# Patient Record
Sex: FEMALE | Race: WHITE | Employment: UNEMPLOYED | URBAN - METROPOLITAN AREA
[De-identification: names, ages, dates, MRNs, and addresses within clinical notes are randomized per-mention and may not be internally consistent; named-entity substitution may affect disease eponyms.]

---

## 2017-10-25 ENCOUNTER — OFFICE VISIT (OUTPATIENT)
Dept: FAMILY MEDICINE CLINIC | Age: 52
End: 2017-10-25

## 2017-10-25 VITALS — HEIGHT: 67 IN | WEIGHT: 164.4 LBS | BODY MASS INDEX: 25.8 KG/M2

## 2017-10-25 DIAGNOSIS — R30.9 URINARY PAIN: Primary | ICD-10-CM

## 2017-10-25 DIAGNOSIS — Z23 ENCOUNTER FOR IMMUNIZATION: ICD-10-CM

## 2017-10-25 LAB
BILIRUB UR QL STRIP: NORMAL
GLUCOSE UR-MCNC: NEGATIVE MG/DL
KETONES P FAST UR STRIP-MCNC: NEGATIVE MG/DL
PH UR STRIP: 7 [PH] (ref 4.6–8)
PROT UR QL STRIP: NORMAL MG/DL
SP GR UR STRIP: 1.02 (ref 1–1.03)
UA UROBILINOGEN AMB POC: NORMAL (ref 0.2–1)
URINALYSIS CLARITY POC: CLEAR
URINALYSIS COLOR POC: NORMAL
URINE BLOOD POC: NORMAL
URINE LEUKOCYTES POC: NEGATIVE
URINE NITRITES POC: POSITIVE

## 2017-10-25 RX ORDER — HYDROCHLOROTHIAZIDE 25 MG/1
TABLET ORAL
Refills: 1 | COMMUNITY
Start: 2017-10-07

## 2017-10-25 RX ORDER — LIOTHYRONINE SODIUM 5 UG/1
TABLET ORAL
Refills: 2 | COMMUNITY
Start: 2017-10-05

## 2017-10-25 RX ORDER — DEXTROAMPHETAMINE SACCHARATE, AMPHETAMINE ASPARTATE, DEXTROAMPHETAMINE SULFATE AND AMPHETAMINE SULFATE 5; 5; 5; 5 MG/1; MG/1; MG/1; MG/1
20 TABLET ORAL 3 TIMES DAILY
Refills: 0 | COMMUNITY
Start: 2017-09-17

## 2017-10-25 RX ORDER — LEVOTHYROXINE SODIUM 200 UG/1
TABLET ORAL
Refills: 2 | COMMUNITY
Start: 2017-09-04

## 2017-10-25 RX ORDER — AMLODIPINE BESYLATE 5 MG/1
TABLET ORAL
Refills: 2 | COMMUNITY
Start: 2017-10-14

## 2017-10-25 RX ORDER — CITALOPRAM 40 MG/1
TABLET, FILM COATED ORAL
Refills: 5 | COMMUNITY
Start: 2017-10-18 | End: 2018-08-31

## 2017-10-25 RX ORDER — CIPROFLOXACIN 500 MG/1
500 TABLET ORAL 2 TIMES DAILY
Qty: 20 TAB | Refills: 0 | Status: SHIPPED | OUTPATIENT
Start: 2017-10-25 | End: 2017-11-04

## 2017-10-25 NOTE — PROGRESS NOTES
Chief Complaint   Patient presents with    New Patient     Establish care    Bladder Infection     Patient seen in the office today with a friend present for a new patient visit  Patient stated her last provider left the practice and needs medication refills   Also has c/o of bladder pain,urinary frequency x's 2 years. Reports having a stent placed then removed. Patient states providers informed her she is resistant to abt's    Written order received to administer 0.5 ml Influenza Vaccine Flulaval Quadrivalent 2017/2018 Formula. After obtaining written consent from the patient,  Flu vaccine was administered to left  deltoid by Brenda Brady LPN. NDC: 59430-687-98 Lot: [id number], Exp: 04/30/18 Manf: [employer].  Patient provided VIS & observed with no s/s of adverse reactions.

## 2017-10-25 NOTE — MR AVS SNAPSHOT
Visit Information Date & Time Provider Department Dept. Phone Encounter #  
 10/25/2017 10:00 AM Tal Azevedo MD [address] [facility] [phone] [id number] Upcoming Health Maintenance Date Due Hepatitis C Screening 1965 DTaP/Tdap/Td series (1 - Tdap) 3/1/1986 PAP AKA CERVICAL CYTOLOGY 3/1/1986 BREAST CANCER SCRN MAMMOGRAM 3/1/2015 FOBT Q 1 YEAR AGE 50-75 3/1/2015 INFLUENZA AGE 9 TO ADULT 8/1/2017 Allergies as of 10/25/2017  Review Complete On: 10/25/2017 By: Tal Azevedo MD  
  
 Severity Noted Reaction Type Reactions Codeine  03/09/2011    Other (comments)  
 pain Imitrex [Sumatriptan Succinate]  03/09/2011    Other (comments) Chest pain Current Immunizations  Reviewed on 5/25/2014 Name Date Influenza Vaccine (Quad) PF  Incomplete Not reviewed this visit You Were Diagnosed With   
  
 Codes Comments Urinary pain    -  Primary ICD-10-CM: R30.9 ICD-9-CM: 788.1 Encounter for immunization     ICD-10-CM: J29 ICD-9-CM: V03.89 Vitals Height(growth percentile) Weight(growth percentile) BMI OB Status Smoking Status 5' 7\" (1.702 m) 164 lb 6.4 oz (74.6 kg) 25.75 kg/m2 Ablation Never Smoker Vitals History BMI and BSA Data Body Mass Index Body Surface Area 25.75 kg/m 2 1.88 m 2 Preferred Pharmacy Pharmacy Name Phone [id number] [address] [facility] [phone] Your Updated Medication List  
  
   
This list is accurate as of: 10/25/17 10:48 AM.  Always use your most recent med list. amLODIPine 5 mg tablet Commonly known as:  Horris Bills TAKE 1 TABLET EVERY DAY  
  
 aspirin 325 mg tablet Commonly known as:  ASPIRIN Take 325 mg by mouth daily. ciprofloxacin HCl 500 mg tablet Commonly known as:  CIPRO Take 1 Tab by mouth two (2) times a day for 10 days.   
  
 citalopram 40 mg tablet Commonly known as:  CELEXA  
TAKE 1 TABLET BY MOUTH ONCE A DAY  
  
 dextroamphetamine-amphetamine 20 mg tablet Commonly known as:  ADDERALL 20 mg three (3) times daily. hydroCHLOROthiazide 25 mg tablet Commonly known as:  HYDRODIURIL  
TAKE 1 TABLET BY MOUTH EVERY DAY  
  
 * levothyroxine 125 mcg tablet Commonly known as:  SYNTHROID Take 125 mcg by mouth Daily (before breakfast). * levothyroxine 200 mcg tablet Commonly known as:  SYNTHROID  
TAKE 1 TABLET BY MOUTH DAILY  
  
 liothyronine 5 mcg tablet Commonly known as:  CYTOMEL  
TAKE 2 TABLETS BY MOUTH EVERY DAY  
  
 TYLENOL EXTRA STRENGTH 500 mg tablet Generic drug:  acetaminophen Take  by mouth every six (6) hours as needed. * Notice: This list has 2 medication(s) that are the same as other medications prescribed for you. Read the directions carefully, and ask your doctor or other care provider to review them with you. Prescriptions Sent to Pharmacy Refills  
 ciprofloxacin HCl (CIPRO) 500 mg tablet 0 Sig: Take 1 Tab by mouth two (2) times a day for 10 days. Class: Normal  
 Pharmacy: [address] [place] [address] Ph #: [phone] Route: Oral  
  
We Performed the Following AMB POC URINALYSIS DIP STICK MANUAL W/O MICRO [96004 CPT(R)] CULTURE, URINE G7192096 CPT(R)] INFLUENZA VIRUS VAC QUAD,SPLIT,PRESV FREE SYRINGE IM [id number] CPT(R)] CA IMMUNIZ ADMIN,1 SINGLE/COMB VAC/TOXOID Q2766405 CPT(R)] Introducing [facility] & HEALTH SERVICES! Silviano Singletary introduces You.Do patient portal. Now you can access parts of your medical record, email your doctor's office, and request medication refills online. 1. In your internet browser, go to [email or web address] 2. Click on the First Time User? Click Here link in the Sign In box. You will see the New Member Sign Up page. 3. Enter your You.Do Access Code exactly as it appears below.  You will not need to use this code after youve completed the sign-up process. If you do not sign up before the expiration date, you must request a new code. · [email or web address] Access Code: [email or web address] Expires: 1/23/2018 10:48 AM 
 
4. Enter the last four digits of your Social Security Number (xxxx) and Date of Birth (mm/dd/yyyy) as indicated and click Submit. You will be taken to the next sign-up page. 5. Create a AdTaily.com ID. This will be your AdT[employer]com login ID and cannot be changed, so think of one that is secure and easy to remember. 6. Create a [employer].com password. You can change your password at any time. 7. Enter your Password Reset Question and Answer. This can be used at a later time if you forget your password. 8. Enter your e-mail address. You will receive e-mail notification when new information is available in [address] 9. Click Sign Up. You can now view and download portions of your medical record. 10. Click the Download Summary menu link to download a portable copy of your medical information. If you have questions, please visit the Frequently Asked Questions section of the [email or web address] website. Remember, AdTaily.com is NOT to be used for urgent needs. For medical emergencies, dial 911. Now available from your iPhone and Android! Please provide this summary of care documentation to your next provider. Your primary care clinician is listed as Mike Guerrero. If you have any questions after today's visit, please call [phone].

## 2017-10-25 NOTE — PROGRESS NOTES
Chief Complaint   Patient presents with    New Patient     Establish care    Bladder Infection     Patient seen in the office today with a friend present for a new patient visit  Patient stated her last provider left the practice and needs medication refills   Also has c/o of bladder pain,urinary frequency x's 2 years. Reports having a stent placed then removed. Patient states providers informed her she is resistant to abt's    Pt is a pt of Dr. Alize Young. Pt is on Adderall, 20mg TID, plans to provide records. Subjective: (As above and below)     Chief Complaint   Patient presents with    New Patient     Establish care    Bladder Infection     she is a 46y.o. year old female who presents for evaluation. Reviewed PmHx, RxHx, FmHx, SocHx, AllgHx and updated in chart. Review of Systems - negative except as listed above    Objective:     Vitals:    10/25/17 1018   Weight: 164 lb 6.4 oz (74.6 kg)   Height: 5' 7\" (1.702 m)     Physical Examination: General appearance - alert, well appearing, and in no distress  Mental status - normal mood, behavior, speech, dress, motor activity, and thought processes  Mouth - mucous membranes moist, pharynx normal without lesions  Chest - clear to auscultation, no wheezes, rales or rhonchi, symmetric air entry  Heart - normal rate, regular rhythm, normal S1, S2, no murmurs, rubs, clicks or gallops  Abdomen - soft, nontender, nondistended, no masses or organomegaly  Musculoskeletal - no joint tenderness, deformity or swelling  Extremities - peripheral pulses normal, no pedal edema, no clubbing or cyanosis    Assessment/ Plan:   1. Urinary pain  -Cipro as written, check culture due to resistant infection  - AMB POC URINALYSIS DIP STICK MANUAL W/O MICRO  - CULTURE, URINE    2.  Encounter for immunization  - Influenza virus vaccine (QUADRIVALENT PRES FREE SYRINGE) IM (16696)  - ID IMMUNIZ ADMIN,1 SINGLE/COMB VAC/TOXOID     Advised pt that adderall can not be prescribed until records are received, will not prescribe for more than twice daily dosing. Follow-up Disposition: As needed  I have discussed the diagnosis with the patient and the intended plan as seen in the above orders. The patient has received an after-visit summary and questions were answered concerning future plans.      Medication Side Effects and Warnings were discussed with patient: yes  Patient Labs were reviewed: yes  Patient Past Records were reviewed:  yes    Richard Lopes M.D.

## 2017-10-27 LAB — BACTERIA UR CULT: ABNORMAL

## 2017-10-29 RX ORDER — NITROFURANTOIN 25; 75 MG/1; MG/1
100 CAPSULE ORAL 2 TIMES DAILY
Qty: 14 CAP | Refills: 0 | Status: SHIPPED | OUTPATIENT
Start: 2017-10-29 | End: 2017-11-05

## 2017-10-29 NOTE — PROGRESS NOTES
Please advise pt that bacteria grown is resistant to current antibiotic. Medication needs to be changed. New Rx sent to pharmacy on file. Please inform.

## 2017-10-31 NOTE — PROGRESS NOTES
[phone] 2nd attempt to contact pt. Recording states number is unavailable. Abnormal lab letter placed in mail for pt.

## 2017-11-01 NOTE — PROGRESS NOTES
[phone] 3rd attempt to contact pt. Left a VM for her to [facility] to office.

## 2018-03-21 ENCOUNTER — ANESTHESIA (OUTPATIENT)
Dept: ENDOSCOPY | Age: 53
End: 2018-03-21
Payer: COMMERCIAL

## 2018-03-21 ENCOUNTER — HOSPITAL ENCOUNTER (OUTPATIENT)
Age: 53
Setting detail: OUTPATIENT SURGERY
Discharge: HOME | End: 2018-03-21
Attending: INTERNAL MEDICINE | Admitting: INTERNAL MEDICINE
Payer: COMMERCIAL

## 2018-03-21 ENCOUNTER — ANESTHESIA EVENT (OUTPATIENT)
Dept: ENDOSCOPY | Age: 53
End: 2018-03-21
Payer: COMMERCIAL

## 2018-03-21 VITALS
BODY MASS INDEX: 24.75 KG/M2 | RESPIRATION RATE: 16 BRPM | OXYGEN SATURATION: 86 % | TEMPERATURE: 98.1 F | HEART RATE: 63 BPM | DIASTOLIC BLOOD PRESSURE: 76 MMHG | SYSTOLIC BLOOD PRESSURE: 137 MMHG | WEIGHT: 158 LBS

## 2018-03-21 PROCEDURE — 77030008684 HC TU ET CUF COVD -B: Performed by: ANESTHESIOLOGY

## 2018-03-21 PROCEDURE — C1726 CATH, BAL DIL, NON-VASCULAR: HCPCS | Performed by: INTERNAL MEDICINE

## 2018-03-21 PROCEDURE — 74011250636 HC RX REV CODE- 250/636: Performed by: INTERNAL MEDICINE

## 2018-03-21 PROCEDURE — 77030026438 HC STYL ET INTUB CARD -A: Performed by: ANESTHESIOLOGY

## 2018-03-21 PROCEDURE — 74011000250 HC RX REV CODE- 250

## 2018-03-21 PROCEDURE — 77030009038 HC CATH BILI STN RTVR BSC -C: Performed by: INTERNAL MEDICINE

## 2018-03-21 PROCEDURE — 76060000033 HC ANESTHESIA 1 TO 1.5 HR: Performed by: INTERNAL MEDICINE

## 2018-03-21 PROCEDURE — 77030012596 HC SPHNTOM BILI BSC -E: Performed by: INTERNAL MEDICINE

## 2018-03-21 PROCEDURE — 76040000008: Performed by: INTERNAL MEDICINE

## 2018-03-21 PROCEDURE — 88112 CYTOPATH CELL ENHANCE TECH: CPT | Performed by: INTERNAL MEDICINE

## 2018-03-21 PROCEDURE — 77030007288 HC DEV LOK BILI BSC -A: Performed by: INTERNAL MEDICINE

## 2018-03-21 PROCEDURE — C2625 STENT, NON-COR, TEM W/DEL SY: HCPCS | Performed by: INTERNAL MEDICINE

## 2018-03-21 PROCEDURE — 77030036933 HC BRSH RX CYTO WREGD BSC -C: Performed by: INTERNAL MEDICINE

## 2018-03-21 PROCEDURE — 77030010603 HC BLN DEV INFL BSC -B: Performed by: INTERNAL MEDICINE

## 2018-03-21 PROCEDURE — 74011636320 HC RX REV CODE- 636/320: Performed by: INTERNAL MEDICINE

## 2018-03-21 PROCEDURE — 74011250636 HC RX REV CODE- 250/636

## 2018-03-21 DEVICE — BILIARY STENT WITH NAVIFLEXTM RX DELIVERY SYSTEM
Type: IMPLANTABLE DEVICE | Site: BILE DUCT | Status: FUNCTIONAL
Brand: ADVANIX™ BILIARY

## 2018-03-21 RX ORDER — ATROPINE SULFATE 0.1 MG/ML
0.5 INJECTION INTRAVENOUS
Status: DISCONTINUED | OUTPATIENT
Start: 2018-03-21 | End: 2018-03-21 | Stop reason: HOSPADM

## 2018-03-21 RX ORDER — MIDAZOLAM HYDROCHLORIDE 1 MG/ML
.25-1 INJECTION, SOLUTION INTRAMUSCULAR; INTRAVENOUS
Status: DISCONTINUED | OUTPATIENT
Start: 2018-03-21 | End: 2018-03-21 | Stop reason: HOSPADM

## 2018-03-21 RX ORDER — ROCURONIUM BROMIDE 10 MG/ML
INJECTION, SOLUTION INTRAVENOUS AS NEEDED
Status: DISCONTINUED | OUTPATIENT
Start: 2018-03-21 | End: 2018-03-21 | Stop reason: HOSPADM

## 2018-03-21 RX ORDER — FLUMAZENIL 0.1 MG/ML
0.2 INJECTION INTRAVENOUS
Status: DISCONTINUED | OUTPATIENT
Start: 2018-03-21 | End: 2018-03-21 | Stop reason: HOSPADM

## 2018-03-21 RX ORDER — DEXTROMETHORPHAN/PSEUDOEPHED 2.5-7.5/.8
1.2 DROPS ORAL
Status: DISCONTINUED | OUTPATIENT
Start: 2018-03-21 | End: 2018-03-21 | Stop reason: HOSPADM

## 2018-03-21 RX ORDER — EPINEPHRINE 0.1 MG/ML
1 INJECTION INTRACARDIAC; INTRAVENOUS
Status: DISCONTINUED | OUTPATIENT
Start: 2018-03-21 | End: 2018-03-21 | Stop reason: HOSPADM

## 2018-03-21 RX ORDER — ONDANSETRON 2 MG/ML
4-8 INJECTION INTRAMUSCULAR; INTRAVENOUS ONCE
Status: COMPLETED | OUTPATIENT
Start: 2018-03-21 | End: 2018-03-21

## 2018-03-21 RX ORDER — FENTANYL CITRATE 50 UG/ML
100 INJECTION, SOLUTION INTRAMUSCULAR; INTRAVENOUS
Status: DISCONTINUED | OUTPATIENT
Start: 2018-03-21 | End: 2018-03-21 | Stop reason: HOSPADM

## 2018-03-21 RX ORDER — PROPOFOL 10 MG/ML
INJECTION, EMULSION INTRAVENOUS AS NEEDED
Status: DISCONTINUED | OUTPATIENT
Start: 2018-03-21 | End: 2018-03-21 | Stop reason: HOSPADM

## 2018-03-21 RX ORDER — SODIUM CHLORIDE 0.9 % (FLUSH) 0.9 %
5-10 SYRINGE (ML) INJECTION AS NEEDED
Status: DISCONTINUED | OUTPATIENT
Start: 2018-03-21 | End: 2018-03-21 | Stop reason: HOSPADM

## 2018-03-21 RX ORDER — SODIUM CHLORIDE 0.9 % (FLUSH) 0.9 %
5-10 SYRINGE (ML) INJECTION EVERY 8 HOURS
Status: DISCONTINUED | OUTPATIENT
Start: 2018-03-21 | End: 2018-03-21 | Stop reason: HOSPADM

## 2018-03-21 RX ORDER — BUPRENORPHINE AND NALOXONE 8; 2 MG/1; MG/1
FILM, SOLUBLE BUCCAL; SUBLINGUAL DAILY
COMMUNITY

## 2018-03-21 RX ORDER — SUCCINYLCHOLINE CHLORIDE 20 MG/ML
INJECTION INTRAMUSCULAR; INTRAVENOUS AS NEEDED
Status: DISCONTINUED | OUTPATIENT
Start: 2018-03-21 | End: 2018-03-21 | Stop reason: HOSPADM

## 2018-03-21 RX ORDER — SODIUM CHLORIDE 9 MG/ML
50 INJECTION, SOLUTION INTRAVENOUS CONTINUOUS
Status: DISCONTINUED | OUTPATIENT
Start: 2018-03-21 | End: 2018-03-21 | Stop reason: HOSPADM

## 2018-03-21 RX ORDER — ONDANSETRON 2 MG/ML
INJECTION INTRAMUSCULAR; INTRAVENOUS
Status: DISCONTINUED
Start: 2018-03-21 | End: 2018-03-21 | Stop reason: HOSPADM

## 2018-03-21 RX ORDER — DEXAMETHASONE SODIUM PHOSPHATE 4 MG/ML
INJECTION, SOLUTION INTRA-ARTICULAR; INTRALESIONAL; INTRAMUSCULAR; INTRAVENOUS; SOFT TISSUE AS NEEDED
Status: DISCONTINUED | OUTPATIENT
Start: 2018-03-21 | End: 2018-03-21 | Stop reason: HOSPADM

## 2018-03-21 RX ORDER — NALOXONE HYDROCHLORIDE 0.4 MG/ML
0.4 INJECTION, SOLUTION INTRAMUSCULAR; INTRAVENOUS; SUBCUTANEOUS
Status: DISCONTINUED | OUTPATIENT
Start: 2018-03-21 | End: 2018-03-21 | Stop reason: HOSPADM

## 2018-03-21 RX ORDER — ONDANSETRON 2 MG/ML
INJECTION INTRAMUSCULAR; INTRAVENOUS AS NEEDED
Status: DISCONTINUED | OUTPATIENT
Start: 2018-03-21 | End: 2018-03-21 | Stop reason: HOSPADM

## 2018-03-21 RX ORDER — LIDOCAINE HYDROCHLORIDE 20 MG/ML
INJECTION, SOLUTION EPIDURAL; INFILTRATION; INTRACAUDAL; PERINEURAL AS NEEDED
Status: DISCONTINUED | OUTPATIENT
Start: 2018-03-21 | End: 2018-03-21 | Stop reason: HOSPADM

## 2018-03-21 RX ADMIN — PROPOFOL 50 MG: 10 INJECTION, EMULSION INTRAVENOUS at 12:21

## 2018-03-21 RX ADMIN — PROPOFOL 150 MG: 10 INJECTION, EMULSION INTRAVENOUS at 11:58

## 2018-03-21 RX ADMIN — ROCURONIUM BROMIDE 5 MG: 10 INJECTION, SOLUTION INTRAVENOUS at 11:58

## 2018-03-21 RX ADMIN — DEXAMETHASONE SODIUM PHOSPHATE 4 MG: 4 INJECTION, SOLUTION INTRA-ARTICULAR; INTRALESIONAL; INTRAMUSCULAR; INTRAVENOUS; SOFT TISSUE at 12:06

## 2018-03-21 RX ADMIN — PROPOFOL 50 MG: 10 INJECTION, EMULSION INTRAVENOUS at 11:59

## 2018-03-21 RX ADMIN — SUCCINYLCHOLINE CHLORIDE 100 MG: 20 INJECTION INTRAMUSCULAR; INTRAVENOUS at 11:58

## 2018-03-21 RX ADMIN — PROPOFOL 50 MG: 10 INJECTION, EMULSION INTRAVENOUS at 12:02

## 2018-03-21 RX ADMIN — ONDANSETRON 4 MG: 2 INJECTION INTRAMUSCULAR; INTRAVENOUS at 13:12

## 2018-03-21 RX ADMIN — SODIUM CHLORIDE: 900 INJECTION, SOLUTION INTRAVENOUS at 11:45

## 2018-03-21 RX ADMIN — LIDOCAINE HYDROCHLORIDE 60 MG: 20 INJECTION, SOLUTION EPIDURAL; INFILTRATION; INTRACAUDAL; PERINEURAL at 11:58

## 2018-03-21 RX ADMIN — IOPAMIDOL 10 ML: 612 INJECTION, SOLUTION INTRAVENOUS at 11:52

## 2018-03-21 RX ADMIN — ONDANSETRON 4 MG: 2 INJECTION INTRAMUSCULAR; INTRAVENOUS at 12:06

## 2018-03-21 NOTE — IP AVS SNAPSHOT
[address] 
[phone] Patient: Renae Courtney MRN: [id number] WUR:[birth year] About your hospitalization You were admitted on:  March 21, 2018 You last received care in the:  [facility] ENDOSCOPY You were discharged on:  March 21, 2018 Why you were hospitalized Your primary diagnosis was:  Not on File Follow-up Information None Discharge Orders None A check jesús indicates which time of day the medication should be taken. My Medications CONTINUE taking these medications Instructions Each Dose to Equal  
 Morning Noon Evening Bedtime  
 amLODIPine 5 mg tablet Commonly known as:  Rachell Sears Your last dose was: Your next dose is: TAKE 1 TABLET EVERY DAY  
     
   
   
   
  
 aspirin 325 mg tablet Commonly known as:  ASPIRIN Your last dose was: Your next dose is: Take 325 mg by mouth daily. 325 mg  
    
   
   
   
  
 citalopram 40 mg tablet Commonly known as:  Maximetereso Barrientos Your last dose was: Your next dose is: TAKE 1 TABLET BY MOUTH ONCE A DAY  
     
   
   
   
  
 dextroamphetamine-amphetamine 20 mg tablet Commonly known as:  ADDERALL Your last dose was: Your next dose is:    
   
   
 20 mg three (3) times daily. 20 mg  
    
   
   
   
  
 hydroCHLOROthiazide 25 mg tablet Commonly known as:  HYDRODIURIL Your last dose was: Your next dose is: TAKE 1 TABLET BY MOUTH EVERY DAY  
     
   
   
   
  
 * levothyroxine 125 mcg tablet Commonly known as:  SYNTHROID Your last dose was: Your next dose is: Take 125 mcg by mouth Daily (before breakfast). 125 mcg * levothyroxine 200 mcg tablet Commonly known as:  SYNTHROID Your last dose was: Your next dose is:  TAKE 1 TABLET BY MOUTH DAILY  
     
   
   
   
  
 liothyronine 5 mcg tablet Commonly known as:  CYTOMEL Your last dose was: Your next dose is: TAKE 2 TABLETS BY MOUTH EVERY DAY  
     
   
   
   
  
 SUBOXONE 8-2 mg Film sublingaul film Generic drug:  buprenorphine-naloxone Your last dose was: Your next dose is:    
   
   
 by SubLINGual route daily. TYLENOL EXTRA STRENGTH 500 mg tablet Generic drug:  acetaminophen Your last dose was: Your next dose is: Take  by mouth every six (6) hours as needed. * Notice: This list has 2 medication(s) that are the same as other medications prescribed for you. Read the directions carefully, and ask your doctor or other care provider to review them with you. Discharge Instructions [facility]. 
[address] [facility] [address] [place], [address]  
[phone] DISCHARGE INSTRUCTIONS Brenda Mcdonald [phone] [id number] 
1965 DISCOMFORT: 
Sore throat- throat lozenges or warm salt water gargle 
redness at IV site- apply warm compress to area; if redness or soreness persist- contact your physician Gaseous discomfort- walking, belching will help relieve any discomfort You may not operate a vehicle for 12 hours You may not engage in an occupation involving machinery or appliances for rest of today You may not drink alcoholic beverages for at least 12 hours Avoid making any critical decisions for at least 24 hour DIET You may eat and drink after you leave. You may resume your regular diet  however -  remember your colon is empty and a heavy meal will produce gas. Avoid these foods:  vegetables, fried / greasy foods, carbonated drinks ACTIVITY You may resume your normal daily activities Spend the remainder of the day resting -  avoid any strenuous activity. CALL M.D. ANY SIGN OF Increasing pain, nausea, vomiting Abdominal distension (swelling) New increased bleeding (oral or rectal) Fever (chills) Pain in chest area Bloody discharge from nose or mouth Shortness of breath Follow-up Instructions: 
 Call Dr. Brown  for any questions or problems. If we took a biopsy please call the office within 2 weeks to discuss your                             pathology results. Telephone # [phone] Continue same medications. Introducing [facility] & HEALTH SERVICES! [facility] introduces [employer] patient portal. Now you can access parts of your medical record, email your doctor's office, and request medication refills online. 1. In your internet browser, go to [email or web address] 2. Click on the First Time User? Click Here link in the Sign In box. You will see the New Member Sign Up page. 3. Enter your [employer] Access Code exactly as it appears below. You will not need to use this code after youve completed the sign-up process. If you do not sign up before the expiration date, you must request a new code. · [employer] Access Code: [email or web address] Expires: 6/19/2018  1:02 PM 
 
4. Enter the last four digits of your Social Security Number (xxxx) and Date of Birth (mm/dd/yyyy) as indicated and click Submit. You will be taken to the next sign-up page. 5. Create a [employer]ne ID. This will be your [employer]ne login ID and cannot be changed, so think of one that is secure and easy to remember. 6. Create a [employer] password. You can change your password at any time. 7. Enter your Password Reset Question and Answer. This can be used at a later time if you forget your password. 8. Enter your e-mail address. You will receive e-mail notification when new information is available in [address] 9. Click Sign Up. You can now view and download portions of your medical record. 10. Click the Download Summary menu link to download a portable copy of your medical information.  
 
If you have questions, please visit the Frequently Asked Questions section of the MyChart website. Remember, MyChart is NOT to be used for urgent needs. For medical emergencies, dial 911. Now available from your iPhone and Android! Providers Seen During Your Hospitalization Provider Specialty Primary office phone Cheko Cobos MD Gastroenterology [phone] Your Primary Care Physician (PCP) Primary Care Physician Office Phone Office Fax Douglas Bauer [phone] You are allergic to the following Allergen Reactions Codeine Other (comments)  
 pain Imitrex (Sumatriptan Succinate) Other (comments) Chest pain Recent Documentation Weight BMI OB Status Smoking Status 71.7 kg 24.75 kg/m2 Ablation Never Smoker Emergency Contacts Name Discharge Info Relation Home Work Mobile Donaldo Pedro DISCHARGE CAREGIVER [3] Boyfriend [17] [phone] Megan Betty CAREGIVER [3] Friend [5] [phone] Patient Belongings The following personal items are in your possession at time of discharge: 
  Dental Appliances: None  Visual Aid: None Please provide this summary of care documentation to your next provider. Signatures-by signing, you are acknowledging that this After Visit Summary has been reviewed with you and you have received a copy. Patient Signature:  ____________________________________________________________ Date:  ____________________________________________________________  
  
Jadyn Markham Provider Signature:  ____________________________________________________________ Date:  ____________________________________________________________

## 2018-03-21 NOTE — DISCHARGE INSTRUCTIONS
[address] [facility].  [address] [facility] [address]  [address] [place] [address]   [phone]                     DISCHARGE INSTRUCTIONS    Priya Ash  [id number]  1965    DISCOMFORT:  Sore throat- throat lozenges or warm salt water gargle  redness at IV site- apply warm compress to area; if redness or soreness persist- contact your physician  Gaseous discomfort- walking, belching will help relieve any discomfort  You may not operate a vehicle for 12 hours  You may not engage in an occupation involving machinery or appliances for rest of today  You may not drink alcoholic beverages for at least 12 hours  Avoid making any critical decisions for at least 24 hour  DIET  You may eat and drink after you leave. You may resume your regular diet - however -  remember your colon is empty and a heavy meal will produce gas. Avoid these foods:  vegetables, fried / greasy foods, carbonated drinks    ACTIVITY  You may resume your normal daily activities   Spend the remainder of the day resting -  avoid any strenuous activity. CALL M.D. ANY SIGN OF   Increasing pain, nausea, vomiting  Abdominal distension (swelling)  New increased bleeding (oral or rectal)  Fever (chills)  Pain in chest area  Bloody discharge from nose or mouth  Shortness of breath    Follow-up Instructions:   Call Dr. Donis Tovar for any questions or problems. If we took a biopsy please call the office within 2 weeks to discuss your                             pathology results. Telephone # [phone]        Continue same medications.

## 2018-03-21 NOTE — ANESTHESIA PREPROCEDURE EVALUATION
Anesthetic History     PONV          Review of Systems / Medical History  Patient summary reviewed, nursing notes reviewed and pertinent labs reviewed    Pulmonary                   Neuro/Psych         TIA     Cardiovascular    Hypertension              Exercise tolerance: >4 METS     GI/Hepatic/Renal               Comments: Biliary obstruction Endo/Other      Hypothyroidism  Arthritis     Other Findings   Comments: Pt on suboxone         Physical Exam    Airway  Mallampati: I  TM Distance: > 6 cm  Neck ROM: normal range of motion   Mouth opening: Normal     Cardiovascular    Rhythm: regular  Rate: normal         Dental         Pulmonary  Breath sounds clear to auscultation               Abdominal         Other Findings            Anesthetic Plan    ASA: 2  Anesthesia type: general          Induction: Intravenous  Anesthetic plan and risks discussed with: Patient

## 2018-03-21 NOTE — H&P
[facility].  217 [facility] 140 [facility], [address]  [phone]                                History and Physical     NAME: Bernard Duvall   :  1965   MRN:  [id number]     HPI:  The patient was seen and examined. Past Surgical History:   Procedure Laterality Date    BIOPSY BREAST      right breast, twice, benign    HX ANKLE FRACTURE TX      Both ankles (, ) & (, )   [address]  1997    HX CERVICAL FUSION  2011    HX CHOLECYSTECTOMY      HX DILATION AND CURETTAGE      multiple    HX GYN      uterine ablation    HX LITHOTRIPSY      multiple    HX OTHER SURGICAL      bilateral axillary glands removed    HX SHOULDER ARTHROSCOPY      Right Shoulder Surgery twice (, )   [address] WOUND      History of multiple strokes, PFO-had surgery, star flex in heart to seal hole. Past Medical History:   Diagnosis Date    [facility]-phospholipid syndrome ([facility]     Calculus of kidney     currently and h/o multiple kidney stones    DDD (degenerative disc disease) age 24    DDD (degenerative disc disease)     Vera Cramp syndrome     H/O: stroke     History of multiple strokes, PFO-had surgery, star flex in heart to seal hole.     Hypertension     Hypothyroidism     takes Synthoid    Infertility female     multiple miscarriages    Lupus     MS (multiple sclerosis) ([facility]     sees neurologist-Dr. Carolyn Gill    Nausea & vomiting     Raynaud disease     Dr Gary Gonzalez ( Rheum)     Stroke [facility])     last stroke was in     Unspecified adverse effect of anesthesia     mother, sister and daughter difficult to arouse     Social History   Substance Use Topics    Smoking status: Never Smoker    Smokeless tobacco: Never Used    Alcohol use No      Comment:       Allergies   Allergen Reactions    Codeine Other (comments)     pain    Imitrex [Sumatriptan Succinate] Other (comments)     Chest pain     Family History   Problem Relation Age of Onset    Cancer Mother      emphysema    Cancer Father      Current Facility-Administered Medications   Medication Dose Route Frequency    glucagon (GLUCAGEN) injection 1 mg  1 mg IntraVENous ONCE         PHYSICAL EXAM:  General: WD, WN. Alert, cooperative, no acute distress    HEENT: NC, Atraumatic. PERRLA, EOMI. Anicteric sclerae. Lungs:  CTA Bilaterally. No Wheezing/Rhonchi/Rales. Heart:  Regular  rhythm,  No murmur, No Rubs, No Gallops  Abdomen: Soft, Non distended, Non tender.  +Bowel sounds, no HSM  Extremities: No c/c/e  Neurologic:  CN 2-12 gi, Alert and oriented X 3. No acute neurological distress   Psych:   Good insight. Not anxious nor agitated. The heart, lungs and mental status were satisfactory for the administration of GA and for the procedure.       Mallampati score: 3       Assessment:   · RUQ pain  · Bile duct stones    Plan:   · Endoscopic procedure  · GA

## 2018-03-21 NOTE — ROUTINE PROCESS
Buren Brittle  1965  [id number]    Situation:  Verbal report received from: Olga Dailey  Procedure: Procedure(s) with comments:  ENDOSCOPIC RETROGRADE CHOLANGIOPANCREATOGRAPHY (ERCP)  ENDOSCOPIC SPHINCTEROTOMY  ENDOSCOPIC STONE EXTRACTION/BALLOON SWEEP  ENDOSCOPIC BRUSHING  BILIARY STENT PLACEMENT  BILIARY DILATATION - 6mm at 11atm held for one minute    Background:    Preoperative diagnosis: Biliary Obstruction  Postoperative diagnosis: 1. ampulary stricture  2. biliary sludge      :  Dr. James Davis  Assistant(s): Endoscopy Technician-1: Marisela Flores  Endoscopy RN-1: Karel Bhagat RN    Specimens: * No specimens in log *  H. Pylori  no    Assessment:  Intra-procedure medications     Anesthesia gave intra-procedure sedation and medications, see anesthesia flow sheet yes    Intravenous fluids: NS@ KVO     Vital signs stable     Abdominal assessment: round and soft     Recommendation:  Discharge patient per MD order.     Family or Friend   Permission to share finding with family or friend yes

## 2018-03-21 NOTE — PROCEDURES
[address] [facility], [place].  [address] [address] [address], [address]  [phone]                   ERCP NOTE    NAME:  Aniyah Swain   :   1965   MRN:   [id number]     Date/Time:  3/21/2018 12:44 PM    Procedure Type:   ERCPwith biliary sphincterotomy, biliary stone removal, biliary stent placement, biliary dilation, biliary tissue sampling     Indications: RUQ pain, dilated bile duct  Pre-operative Diagnosis: see indication above  Post-operative Diagnosis:  See findings below  : Barbette Sandifer, MD    Referring Provider:    Jason Anderson MD, Tawny Payne MD    Sedation:  General anesthesia    Procedure Details:  After informed consent was obtained with all risks and benefits of procedure explained, the patient was taken to the fluoroscopy suite and placed in the prone position. Upon sequential sedation as per above, the Olympus duodenoscope [id number]   was inserted via the mouthpeice and carefully advanced to the second portion of the duodenum. The quality of visualization was good. The duodenoscope was withdrawn into a short position. Findings:   Esophagus: not examined in detail  Stomach: not examined in detail  Duodenum/jejunum: normal  Ampulla:small   Cholangiogram: -Bile duct was selectively cannulated in 1st attempt. Contrast was injected. A smooth short segment stricture was seen in the distal common bile duct immediately above the ampulla and was about 1 cm in length. The common bile duct proximal to the stricture was diffusely dilated with largest diameter about 15 mm in mid portion. Clips of lap jose were seen. Few irregular filing defects were noted in distal common bile duct with largest about 3 mm. Biliary sphincterotomy was performed using Dreamtome and ERBE. Stricture dilation with 6 mm Hurricane balloon was successful. Biliary brushings were performed. Balloon sweep with an extraction balloon 12-15 mm injection below yielded biliary sludge.  A 10 Fr X 7 cm Advanix plastic biliary stent was placed successfully. Stent was in good position. Contrast and bile flowed promptly through the stent. Pancreatogram:not performed    Specimen Removed: * No specimens in log *    Complications: None. EBL:  None. Interventions:    Pancreatic: none  Biliary: Bile duct was selectively cannulated in 1st attempt. Contrast was injected. A smooth short segment stricture was seen in the distal common bile duct immediately above the ampulla and was about 1 cm in length. The common bile duct proximal to the stricture was diffusely dilated with largest diameter about 15 mm in mid portion. Clips of lap jose were seen. Few irregular filing defects were noted in distal common bile duct with largest about 3 mm. Biliary sphincterotomy was performed using Dreamtome and ERBE. Stricture dilation with 6 mm Hurricane balloon was successful. Biliary brushings were performed. Balloon sweep with an extraction balloon 12-15 mm injection below yielded biliary sludge. A 10 Fr X 7 cm Advanix plastic biliary stent was placed successfully. Stent was in good position. Contrast and bile flowed promptly through the stent. Impression:    -Bile duct stricture-sampled and stented  -Biliary sludge- extracted    Recommendations:    Clear liquid diet and advance as tolerated. Resume normal medication(s).   NO aspirin/NSAID's for 7 days    Await cytology results  Watch for complications  ERCP in 3 months for stent removal/change      Discharge Disposition:  Home following recovery in Endoscopy    Palomo Jorge MD  3/21/2018  12:44 PM

## 2018-03-22 ENCOUNTER — HOSPITAL ENCOUNTER (OUTPATIENT)
Dept: GENERAL RADIOLOGY | Age: 53
Setting detail: OUTPATIENT SURGERY
Discharge: HOME | End: 2018-03-22
Attending: INTERNAL MEDICINE

## 2018-03-22 NOTE — ANESTHESIA POSTPROCEDURE EVALUATION
Post-Anesthesia Evaluation and Assessment    Patient: Washington Ndiaye MRN: [id number]  SSN: [id number]    YOB: 1965  Age: 48 y.o. Sex: female       Cardiovascular Function/Vital Signs  Visit Vitals    /76    Pulse 63    Temp 36.7 °C (98.1 °F)    Resp 16    Wt 71.7 kg (158 lb)    SpO2 (!) 86%    BMI 24.75 kg/m2       Patient is status post general anesthesia for Procedure(s):  ENDOSCOPIC RETROGRADE CHOLANGIOPANCREATOGRAPHY (ERCP)  ENDOSCOPIC SPHINCTEROTOMY  ENDOSCOPIC STONE EXTRACTION/BALLOON SWEEP  ENDOSCOPIC BRUSHING  BILIARY STENT PLACEMENT  BILIARY DILATATION. Nausea/Vomiting: None    Postoperative hydration reviewed and adequate. Pain:  Pain Scale 1: Numeric (0 - 10) (03/21/18 1337)  Pain Intensity 1: 0 (03/21/18 1337)   Managed    Neurological Status: At baseline    Mental Status and Level of Consciousness: Arousable    Pulmonary Status:   O2 Device: Room air (03/21/18 1337)   Adequate oxygenation and airway patent    Complications related to anesthesia: None    Post-anesthesia assessment completed.  No concerns    Signed By: Nicole Mcelroy MD     March 22, 2018

## 2018-03-28 ENCOUNTER — APPOINTMENT (OUTPATIENT)
Dept: ULTRASOUND IMAGING | Age: 53
End: 2018-03-28
Attending: EMERGENCY MEDICINE
Payer: COMMERCIAL

## 2018-03-28 ENCOUNTER — HOSPITAL ENCOUNTER (EMERGENCY)
Age: 53
Discharge: HOME | End: 2018-03-28
Attending: EMERGENCY MEDICINE
Payer: COMMERCIAL

## 2018-03-28 VITALS
HEIGHT: 67 IN | BODY MASS INDEX: 25.68 KG/M2 | SYSTOLIC BLOOD PRESSURE: 121 MMHG | RESPIRATION RATE: 16 BRPM | TEMPERATURE: 98.3 F | WEIGHT: 163.6 LBS | HEART RATE: 60 BPM | DIASTOLIC BLOOD PRESSURE: 69 MMHG | OXYGEN SATURATION: 96 %

## 2018-03-28 DIAGNOSIS — R10.11 ABDOMINAL PAIN, RIGHT UPPER QUADRANT: Primary | ICD-10-CM

## 2018-03-28 LAB
ALBUMIN SERPL-MCNC: 3.4 G/DL (ref 3.5–5)
ALBUMIN/GLOB SERPL: 0.7 {RATIO} (ref 1.1–2.2)
ALP SERPL-CCNC: 90 U/L (ref 45–117)
ALT SERPL-CCNC: 33 U/L (ref 12–78)
ANION GAP SERPL CALC-SCNC: 5 MMOL/L (ref 5–15)
APPEARANCE UR: CLEAR
AST SERPL-CCNC: 34 U/L (ref 15–37)
BACTERIA URNS QL MICRO: NEGATIVE /HPF
BASOPHILS # BLD: 0 K/UL (ref 0–0.1)
BASOPHILS NFR BLD: 1 % (ref 0–1)
BILIRUB SERPL-MCNC: 0.3 MG/DL (ref 0.2–1)
BILIRUB UR QL CFM: NEGATIVE
BUN SERPL-MCNC: 15 MG/DL (ref 6–20)
BUN/CREAT SERPL: 21 (ref 12–20)
CALCIUM SERPL-MCNC: 9.6 MG/DL (ref 8.5–10.1)
CHLORIDE SERPL-SCNC: 98 MMOL/L (ref 97–108)
CO2 SERPL-SCNC: 35 MMOL/L (ref 21–32)
COLOR UR: ABNORMAL
CREAT SERPL-MCNC: 0.7 MG/DL (ref 0.55–1.02)
DIFFERENTIAL METHOD BLD: ABNORMAL
EOSINOPHIL # BLD: 0.4 K/UL (ref 0–0.4)
EOSINOPHIL NFR BLD: 5 % (ref 0–7)
EPITH CASTS URNS QL MICRO: ABNORMAL /LPF
ERYTHROCYTE [DISTWIDTH] IN BLOOD BY AUTOMATED COUNT: 13.3 % (ref 11.5–14.5)
GLOBULIN SER CALC-MCNC: 5.2 G/DL (ref 2–4)
GLUCOSE SERPL-MCNC: 97 MG/DL (ref 65–100)
GLUCOSE UR STRIP.AUTO-MCNC: NEGATIVE MG/DL
HCT VFR BLD AUTO: 39.9 % (ref 35–47)
HGB BLD-MCNC: 12.6 G/DL (ref 11.5–16)
HGB UR QL STRIP: NEGATIVE
IMM GRANULOCYTES # BLD: 0 K/UL (ref 0–0.04)
IMM GRANULOCYTES NFR BLD AUTO: 0 % (ref 0–0.5)
KETONES UR QL STRIP.AUTO: NEGATIVE MG/DL
LEUKOCYTE ESTERASE UR QL STRIP.AUTO: NEGATIVE
LIPASE SERPL-CCNC: 243 U/L (ref 73–393)
LYMPHOCYTES # BLD: 2.1 K/UL (ref 0.8–3.5)
LYMPHOCYTES NFR BLD: 28 % (ref 12–49)
MCH RBC QN AUTO: 28.4 PG (ref 26–34)
MCHC RBC AUTO-ENTMCNC: 31.6 G/DL (ref 30–36.5)
MCV RBC AUTO: 89.9 FL (ref 80–99)
MONOCYTES # BLD: 0.6 K/UL (ref 0–1)
MONOCYTES NFR BLD: 8 % (ref 5–13)
MUCOUS THREADS URNS QL MICRO: ABNORMAL /LPF
NEUTS SEG # BLD: 4.6 K/UL (ref 1.8–8)
NEUTS SEG NFR BLD: 59 % (ref 32–75)
NITRITE UR QL STRIP.AUTO: NEGATIVE
NRBC # BLD: 0 K/UL (ref 0–0.01)
NRBC BLD-RTO: 0 PER 100 WBC
PH UR STRIP: 6 [PH] (ref 5–8)
PLATELET # BLD AUTO: 343 K/UL (ref 150–400)
PMV BLD AUTO: 8.5 FL (ref 8.9–12.9)
POTASSIUM SERPL-SCNC: 4 MMOL/L (ref 3.5–5.1)
PROT SERPL-MCNC: 8.6 G/DL (ref 6.4–8.2)
PROT UR STRIP-MCNC: ABNORMAL MG/DL
RBC # BLD AUTO: 4.44 M/UL (ref 3.8–5.2)
RBC #/AREA URNS HPF: ABNORMAL /HPF (ref 0–5)
SODIUM SERPL-SCNC: 138 MMOL/L (ref 136–145)
SP GR UR REFRACTOMETRY: >1.03 (ref 1–1.03)
TROPONIN I SERPL-MCNC: <0.04 NG/ML
UR CULT HOLD, URHOLD: NORMAL
UROBILINOGEN UR QL STRIP.AUTO: 1 EU/DL (ref 0.2–1)
WBC # BLD AUTO: 7.8 K/UL (ref 3.6–11)
WBC URNS QL MICRO: ABNORMAL /HPF (ref 0–4)

## 2018-03-28 PROCEDURE — 76705 ECHO EXAM OF ABDOMEN: CPT

## 2018-03-28 PROCEDURE — 84484 ASSAY OF TROPONIN QUANT: CPT | Performed by: EMERGENCY MEDICINE

## 2018-03-28 PROCEDURE — 80053 COMPREHEN METABOLIC PANEL: CPT | Performed by: STUDENT IN AN ORGANIZED HEALTH CARE EDUCATION/TRAINING PROGRAM

## 2018-03-28 PROCEDURE — 83690 ASSAY OF LIPASE: CPT | Performed by: EMERGENCY MEDICINE

## 2018-03-28 PROCEDURE — 93005 ELECTROCARDIOGRAM TRACING: CPT

## 2018-03-28 PROCEDURE — 36415 COLL VENOUS BLD VENIPUNCTURE: CPT | Performed by: STUDENT IN AN ORGANIZED HEALTH CARE EDUCATION/TRAINING PROGRAM

## 2018-03-28 PROCEDURE — 99284 EMERGENCY DEPT VISIT MOD MDM: CPT

## 2018-03-28 PROCEDURE — 85025 COMPLETE CBC W/AUTO DIFF WBC: CPT | Performed by: STUDENT IN AN ORGANIZED HEALTH CARE EDUCATION/TRAINING PROGRAM

## 2018-03-28 PROCEDURE — 74011000250 HC RX REV CODE- 250: Performed by: EMERGENCY MEDICINE

## 2018-03-28 PROCEDURE — 74011250637 HC RX REV CODE- 250/637: Performed by: EMERGENCY MEDICINE

## 2018-03-28 PROCEDURE — 81001 URINALYSIS AUTO W/SCOPE: CPT | Performed by: STUDENT IN AN ORGANIZED HEALTH CARE EDUCATION/TRAINING PROGRAM

## 2018-03-28 RX ORDER — ONDANSETRON 4 MG/1
4 TABLET, ORALLY DISINTEGRATING ORAL
Qty: 10 TAB | Refills: 0 | Status: SHIPPED | OUTPATIENT
Start: 2018-03-28 | End: 2018-08-31

## 2018-03-28 RX ORDER — SUCRALFATE 1 G/1
1 TABLET ORAL 4 TIMES DAILY
Qty: 30 TAB | Refills: 0 | Status: SHIPPED | OUTPATIENT
Start: 2018-03-28 | End: 2018-08-31

## 2018-03-28 RX ADMIN — LIDOCAINE HYDROCHLORIDE 40 ML: 20 SOLUTION ORAL; TOPICAL at 22:05

## 2018-03-29 LAB
ATRIAL RATE: 67 BPM
CALCULATED P AXIS, ECG09: 77 DEGREES
CALCULATED R AXIS, ECG10: 49 DEGREES
CALCULATED T AXIS, ECG11: -2 DEGREES
DIAGNOSIS, 93000: NORMAL
P-R INTERVAL, ECG05: 126 MS
Q-T INTERVAL, ECG07: 392 MS
QRS DURATION, ECG06: 88 MS
QTC CALCULATION (BEZET), ECG08: 414 MS
VENTRICULAR RATE, ECG03: 67 BPM

## 2018-03-29 NOTE — ED NOTES
Pt ua collected and sent to lab, pt placed on monitor, family at bedside, aware she is to have US, ROSALINE, VSS, [address] [facility].

## 2018-03-29 NOTE — ED PROVIDER NOTES
HPI Comments: 48 y.o. female with past medical history significant for MS, Lupus, Stroke, and  who presents from Home with chief complaint of Abdominal Pain. Patient had an ERCP completed a week ago on 3/21/18 during which time patient had a biliary stent placed by Dr. Jolanta Escobar. Patient states constant abdominal pain since procedure. Patient was seen at [address] [place] [address], during which time she was diagnosed with a UTI and given an antibiotic, antinausea medication, and Prilosec. Patient spoke with Dr. Joleen Lucia office today who referred her to the ED for further evaluation. Patient states episodic right sided abdominal pain with radiation into her back. Pt reports Lexus Olivia is able to find a comfortable position during which time the pain subsides for about 30 minutes\". Pt reports aggravation with palpation and with food intake. Pt states accompanying intermittent chills, fever, nausea, and vomiting. Patient is seeing a UroGynecologist tomorrow. Pt denies cough, congestion, shortness of breath, chest pain,  diarrhea, difficulty with urination or dysuria. There are no other acute medical concerns at this time. PCP: Katerina Mcconnell MD    Note written by Candance Penny, Scribe, as dictated by True Howard MD 9:07 PM    The history is provided by the patient. Past Medical History:   Diagnosis Date    Anti-phospholipid syndrome ([address] 2005    Calculus of kidney     currently and h/o multiple kidney stones    DDD (degenerative disc disease) age 24    DDD (degenerative disc disease)     Jacinta Peers syndrome     H/O: stroke 2005    History of multiple strokes, PFO-had surgery, star flex in heart to seal hole.     Hypertension     Hypothyroidism     takes Synthoid    Infertility female     multiple miscarriages    Lupus 2005    MS (multiple sclerosis) ([address] 2005    sees neurologist-Dr. Whit Khan    Nausea & vomiting     Raynaud disease     Dr Gore Other ( Rheum)     Stroke [facility])     last stroke was in 2005    Unspecified adverse effect of anesthesia     mother, sister and daughter difficult to arouse       Past Surgical History:   Procedure Laterality Date    BIOPSY BREAST      right breast, twice, benign    HX ANKLE FRACTURE TX      Both ankles (1983, 1984) & (1996, 1997)   [address]  December 1997    HX CERVICAL FUSION  09/2011    HX CHOLECYSTECTOMY  1994    HX DILATION AND CURETTAGE      multiple    HX GYN      uterine ablation    HX LITHOTRIPSY      multiple    HX OTHER SURGICAL      bilateral axillary glands removed    HX SHOULDER ARTHROSCOPY      Right Shoulder Surgery twice (1996, 2006)   [facility] [address]  2005    History of multiple strokes, PFO-had surgery, star flex in heart to seal hole. Family History:   Problem Relation Age of Onset    Cancer Mother      emphysema    Cancer Father        Social History     Social History    Marital status:      Spouse name: N/A    Number of children: N/A    Years of education: N/A     Occupational History    Not on file. Social History Main Topics    Smoking status: Never Smoker    Smokeless tobacco: Never Used    Alcohol use No      Comment:      Drug use: No    Sexual activity: Not Currently     Other Topics Concern    Not on file     Social History Narrative         ALLERGIES: Codeine and Imitrex [sumatriptan succinate]    Review of Systems   Constitutional: Positive for chills and fever. HENT: Negative for congestion. Respiratory: Negative for cough and shortness of breath. Cardiovascular: Negative for chest pain. Gastrointestinal: Positive for abdominal pain, nausea and vomiting. Negative for diarrhea. Genitourinary: Negative for difficulty urinating and dysuria. All other systems reviewed and are negative.       Vitals:    03/28/18 2027 03/28/18 2120   BP: 159/82 143/72   Pulse: 78 61   Resp: 20 16   Temp: 98.3 °F (36.8 °C) 98.3 °F (36.8 °C)   SpO2: 96% 98%   Weight: 74.2 kg (163 lb 9.6 oz)    Height: 5' 7\" (1.702 m)             Physical Exam   Vital signs reviewed. Nursing notes reviewed. Const:  No acute distress, well developed, well nourished, Appears uncomfortable  Head:  Atraumatic, normocephalic  Eyes:  PERRL, conjunctiva normal, no scleral icterus  Neck:  Supple, trachea midline  Cardiovascular:  RRR, no murmurs, no gallops, no rubs  Resp:  No resp distress, no increased work of breathing, no wheezes, no rhonchi, no rales,  Abd:  Soft, non-distended, no rebound, no guarding, no CVA tenderness, right upper quadrant tenderness  :  Deferred  MSK:  No pedal edema, normal ROM  Neuro:  Alert and oriented x3, no cranial nerve defect  Skin:  Warm, dry, intact  Psych: normal mood and affect, behavior is normal, judgement and thought content is normal  Note written by Katia Quiroz, as dictated by Rachel Roca MD 9:08 PM    MDM  Number of Diagnoses or Management Options  Abdominal pain, right upper quadrant:      Amount and/or Complexity of Data Reviewed  Clinical lab tests: ordered and reviewed  Tests in the radiology section of CPT®: ordered and reviewed  Review and summarize past medical records: yes    Patient Progress  Patient progress: stable      ED Course     Pt. Presents to the ER with complaints of abdominal pain. Pt. Had an ERCP one week ago. Pt. Is well appearing in the ER. No abnormalities on ultrasound. Normal LFTs. Normal WBC. Unclear cause of her pain. I spoke with GI who recommends close f/u with Dr. Loraine Rossi but ok for discharge. Pt. Is already on a PPI. I will add carafate and zofran. Pt. To return to the ER with worsening sx. Procedures    CONSULT NOTE:  10:14 PM Rachel Roca MD spoke with Dr. Esther Fowler, Consult for Gastroenterology. Discussed available diagnostic tests and clinical findings. Recommends continuing patient on a PPI and prescribing Carafate.  Have patient follow up with Jaspreet Rossi tomorrow

## 2018-03-29 NOTE — DISCHARGE INSTRUCTIONS
Abdominal Pain: Care Instructions  Your Care Instructions    Abdominal pain has many possible causes. Some aren't serious and get better on their own in a few days. Others need more testing and treatment. If your pain continues or gets worse, you need to be rechecked and may need more tests to find out what is wrong. You may need surgery to correct the problem. Don't ignore new symptoms, such as fever, nausea and vomiting, urination problems, pain that gets worse, and dizziness. These may be signs of a more serious problem. Your doctor may have recommended a follow-up visit in the next 8 to 12 hours. If you are not getting better, you may need more tests or treatment. The doctor has checked you carefully, but problems can develop later. If you notice any problems or new symptoms, get medical treatment right away. Follow-up care is a key part of your treatment and safety. Be sure to make and go to all appointments, and call your doctor if you are having problems. It's also a good idea to know your test results and keep a list of the medicines you take. How can you care for yourself at home? · Rest until you feel better. · To prevent dehydration, drink plenty of fluids, enough so that your urine is light yellow or clear like water. Choose water and other caffeine-free clear liquids until you feel better. If you have kidney, heart, or liver disease and have to limit fluids, talk with your doctor before you increase the amount of fluids you drink. · If your stomach is upset, eat mild foods, such as rice, dry toast or crackers, bananas, and applesauce. Try eating several small meals instead of two or three large ones. · Wait until 48 hours after all symptoms have gone away before you have spicy foods, alcohol, and drinks that contain caffeine. · Do not eat foods that are high in fat. · Avoid anti-inflammatory medicines such as aspirin, ibuprofen (Advil, Motrin), and naproxen (Aleve).  These can cause stomach upset. Talk to your doctor if you take daily aspirin for another health problem. When should you call for help? Call 911 anytime you think you may need emergency care. For example, call if:  ? · You passed out (lost consciousness). ? · You pass maroon or very bloody stools. ? · You vomit blood or what looks like coffee grounds. ? · You have new, severe belly pain. ?Call your doctor now or seek immediate medical care if:  ? · Your pain gets worse, especially if it becomes focused in one area of your belly. ? · You have a new or higher fever. ? · Your stools are black and look like tar, or they have streaks of blood. ? · You have unexpected vaginal bleeding. ? · You have symptoms of a urinary tract infection. These may include:  ¨ Pain when you urinate. ¨ Urinating more often than usual.  ¨ Blood in your urine. ? · You are dizzy or lightheaded, or you feel like you may faint. ? Watch closely for changes in your health, and be sure to contact your doctor if:  ? · You are not getting better after 1 day (24 hours). Where can you learn more? Go to http://bob-charleen.info/. Enter W955 in the search box to learn more about \"Abdominal Pain: Care Instructions. \"  Current as of: March 20, 2017  Content Version: 11.4  © 1808-7218 [employer]. Care instructions adapted under license by [employer] (which disclaims liability or warranty for this information). If you have questions about a medical condition or this instruction, always ask your healthcare professional. [place] [address] any warranty or liability for your use of this information.

## 2018-03-29 NOTE — ED NOTES
Pt d/c'd home, IV d/c'd-no complications noted. Pt given d/c packet and prescriptions, ambulated with balanced and steady gait to lobby with family, ROSALINE, FREDYS.

## 2018-03-29 NOTE — ED TRIAGE NOTES
ERCP done by Dr. Francheska Shanks on Wednesday. Patient now with abdominal pain.   Called Dr. Jeanmarie Johnson and was told to come to ED

## 2018-03-29 NOTE — ED NOTES
Pt returned from [address] [facility], placed back on monitor, family at bedside, [address] [facility], [address].

## 2018-06-21 ENCOUNTER — HOSPITAL ENCOUNTER (EMERGENCY)
Age: 53
Discharge: HOME | End: 2018-06-21
Attending: EMERGENCY MEDICINE
Payer: COMMERCIAL

## 2018-06-21 VITALS
WEIGHT: 160 LBS | SYSTOLIC BLOOD PRESSURE: 141 MMHG | TEMPERATURE: 97.6 F | DIASTOLIC BLOOD PRESSURE: 83 MMHG | HEART RATE: 61 BPM | OXYGEN SATURATION: 99 % | RESPIRATION RATE: 16 BRPM | HEIGHT: 67 IN | BODY MASS INDEX: 25.11 KG/M2

## 2018-06-21 DIAGNOSIS — N30.00 ACUTE CYSTITIS WITHOUT HEMATURIA: ICD-10-CM

## 2018-06-21 DIAGNOSIS — E86.0 DEHYDRATION: Primary | ICD-10-CM

## 2018-06-21 LAB
ALBUMIN SERPL-MCNC: 3.6 G/DL (ref 3.5–5)
ALBUMIN/GLOB SERPL: 0.8 {RATIO} (ref 1.1–2.2)
ALP SERPL-CCNC: 86 U/L (ref 45–117)
ALT SERPL-CCNC: 42 U/L (ref 12–78)
ANION GAP SERPL CALC-SCNC: 7 MMOL/L (ref 5–15)
APPEARANCE UR: ABNORMAL
AST SERPL-CCNC: 48 U/L (ref 15–37)
BACTERIA URNS QL MICRO: ABNORMAL /HPF
BASOPHILS # BLD: 0 K/UL (ref 0–0.1)
BASOPHILS NFR BLD: 1 % (ref 0–1)
BILIRUB DIRECT SERPL-MCNC: <0.1 MG/DL (ref 0–0.2)
BILIRUB SERPL-MCNC: 0.4 MG/DL (ref 0.2–1)
BILIRUB UR QL: NEGATIVE
BUN SERPL-MCNC: 13 MG/DL (ref 6–20)
BUN/CREAT SERPL: 20 (ref 12–20)
CALCIUM SERPL-MCNC: 9.2 MG/DL (ref 8.5–10.1)
CHLORIDE SERPL-SCNC: 102 MMOL/L (ref 97–108)
CO2 SERPL-SCNC: 30 MMOL/L (ref 21–32)
COLOR UR: ABNORMAL
CREAT SERPL-MCNC: 0.66 MG/DL (ref 0.55–1.02)
DIFFERENTIAL METHOD BLD: ABNORMAL
EOSINOPHIL # BLD: 0.2 K/UL (ref 0–0.4)
EOSINOPHIL NFR BLD: 2 % (ref 0–7)
EPITH CASTS URNS QL MICRO: ABNORMAL /LPF
ERYTHROCYTE [DISTWIDTH] IN BLOOD BY AUTOMATED COUNT: 13.4 % (ref 11.5–14.5)
GLOBULIN SER CALC-MCNC: 4.6 G/DL (ref 2–4)
GLUCOSE SERPL-MCNC: 88 MG/DL (ref 65–100)
GLUCOSE UR STRIP.AUTO-MCNC: NEGATIVE MG/DL
HCT VFR BLD AUTO: 41.4 % (ref 35–47)
HGB BLD-MCNC: 13.3 G/DL (ref 11.5–16)
HGB UR QL STRIP: ABNORMAL
IMM GRANULOCYTES # BLD: 0 K/UL (ref 0–0.04)
IMM GRANULOCYTES NFR BLD AUTO: 0 % (ref 0–0.5)
KETONES UR QL STRIP.AUTO: 40 MG/DL
LEUKOCYTE ESTERASE UR QL STRIP.AUTO: ABNORMAL
LIPASE SERPL-CCNC: 179 U/L (ref 73–393)
LYMPHOCYTES # BLD: 1.9 K/UL (ref 0.8–3.5)
LYMPHOCYTES NFR BLD: 23 % (ref 12–49)
MAGNESIUM SERPL-MCNC: 1.5 MG/DL (ref 1.6–2.4)
MCH RBC QN AUTO: 27.8 PG (ref 26–34)
MCHC RBC AUTO-ENTMCNC: 32.1 G/DL (ref 30–36.5)
MCV RBC AUTO: 86.4 FL (ref 80–99)
MONOCYTES # BLD: 0.6 K/UL (ref 0–1)
MONOCYTES NFR BLD: 8 % (ref 5–13)
NEUTS SEG # BLD: 5.2 K/UL (ref 1.8–8)
NEUTS SEG NFR BLD: 66 % (ref 32–75)
NITRITE UR QL STRIP.AUTO: POSITIVE
NRBC # BLD: 0 K/UL (ref 0–0.01)
NRBC BLD-RTO: 0 PER 100 WBC
PH UR STRIP: 6.5 [PH] (ref 5–8)
PLATELET # BLD AUTO: 268 K/UL (ref 150–400)
PMV BLD AUTO: 8.6 FL (ref 8.9–12.9)
POTASSIUM SERPL-SCNC: 4.1 MMOL/L (ref 3.5–5.1)
PROT SERPL-MCNC: 8.2 G/DL (ref 6.4–8.2)
PROT UR STRIP-MCNC: ABNORMAL MG/DL
RBC # BLD AUTO: 4.79 M/UL (ref 3.8–5.2)
RBC #/AREA URNS HPF: ABNORMAL /HPF (ref 0–5)
SODIUM SERPL-SCNC: 139 MMOL/L (ref 136–145)
SP GR UR REFRACTOMETRY: 1.03 (ref 1–1.03)
UR CULT HOLD, URHOLD: NORMAL
UROBILINOGEN UR QL STRIP.AUTO: 1 EU/DL (ref 0.2–1)
WBC # BLD AUTO: 7.9 K/UL (ref 3.6–11)
WBC URNS QL MICRO: ABNORMAL /HPF (ref 0–4)

## 2018-06-21 PROCEDURE — 96365 THER/PROPH/DIAG IV INF INIT: CPT

## 2018-06-21 PROCEDURE — 87086 URINE CULTURE/COLONY COUNT: CPT | Performed by: EMERGENCY MEDICINE

## 2018-06-21 PROCEDURE — 80048 BASIC METABOLIC PNL TOTAL CA: CPT | Performed by: EMERGENCY MEDICINE

## 2018-06-21 PROCEDURE — 99284 EMERGENCY DEPT VISIT MOD MDM: CPT

## 2018-06-21 PROCEDURE — 85025 COMPLETE CBC W/AUTO DIFF WBC: CPT | Performed by: EMERGENCY MEDICINE

## 2018-06-21 PROCEDURE — 87077 CULTURE AEROBIC IDENTIFY: CPT | Performed by: EMERGENCY MEDICINE

## 2018-06-21 PROCEDURE — 83690 ASSAY OF LIPASE: CPT | Performed by: EMERGENCY MEDICINE

## 2018-06-21 PROCEDURE — 83735 ASSAY OF MAGNESIUM: CPT | Performed by: EMERGENCY MEDICINE

## 2018-06-21 PROCEDURE — 36415 COLL VENOUS BLD VENIPUNCTURE: CPT | Performed by: EMERGENCY MEDICINE

## 2018-06-21 PROCEDURE — 96375 TX/PRO/DX INJ NEW DRUG ADDON: CPT

## 2018-06-21 PROCEDURE — 96361 HYDRATE IV INFUSION ADD-ON: CPT

## 2018-06-21 PROCEDURE — 74011250637 HC RX REV CODE- 250/637: Performed by: EMERGENCY MEDICINE

## 2018-06-21 PROCEDURE — 74011250636 HC RX REV CODE- 250/636: Performed by: EMERGENCY MEDICINE

## 2018-06-21 PROCEDURE — 87186 SC STD MICRODIL/AGAR DIL: CPT | Performed by: EMERGENCY MEDICINE

## 2018-06-21 PROCEDURE — 93005 ELECTROCARDIOGRAM TRACING: CPT

## 2018-06-21 PROCEDURE — 81001 URINALYSIS AUTO W/SCOPE: CPT | Performed by: EMERGENCY MEDICINE

## 2018-06-21 PROCEDURE — 74011000258 HC RX REV CODE- 258: Performed by: EMERGENCY MEDICINE

## 2018-06-21 PROCEDURE — 80076 HEPATIC FUNCTION PANEL: CPT | Performed by: EMERGENCY MEDICINE

## 2018-06-21 RX ORDER — PROMETHAZINE HYDROCHLORIDE 25 MG/1
25 TABLET ORAL
Status: COMPLETED | OUTPATIENT
Start: 2018-06-21 | End: 2018-06-21

## 2018-06-21 RX ORDER — ONDANSETRON 8 MG/1
8 TABLET, ORALLY DISINTEGRATING ORAL
Qty: 10 TAB | Refills: 0 | Status: SHIPPED | OUTPATIENT
Start: 2018-06-21 | End: 2018-08-31

## 2018-06-21 RX ORDER — ONDANSETRON 2 MG/ML
4 INJECTION INTRAMUSCULAR; INTRAVENOUS AS NEEDED
Status: DISCONTINUED | OUTPATIENT
Start: 2018-06-21 | End: 2018-06-21 | Stop reason: HOSPADM

## 2018-06-21 RX ORDER — CEFDINIR 300 MG/1
300 CAPSULE ORAL 2 TIMES DAILY
Qty: 14 CAP | Refills: 0 | Status: SHIPPED | OUTPATIENT
Start: 2018-06-21 | End: 2018-06-27

## 2018-06-21 RX ADMIN — CEFTRIAXONE SODIUM 1 G: 1 INJECTION, POWDER, FOR SOLUTION INTRAMUSCULAR; INTRAVENOUS at 18:08

## 2018-06-21 RX ADMIN — SODIUM CHLORIDE 1000 ML: 900 INJECTION, SOLUTION INTRAVENOUS at 16:28

## 2018-06-21 RX ADMIN — SODIUM CHLORIDE 1000 ML: 900 INJECTION, SOLUTION INTRAVENOUS at 16:29

## 2018-06-21 RX ADMIN — PROMETHAZINE HYDROCHLORIDE 25 MG: 25 TABLET ORAL at 18:08

## 2018-06-21 RX ADMIN — ONDANSETRON HYDROCHLORIDE 4 MG: 2 INJECTION INTRAMUSCULAR; INTRAVENOUS at 16:29

## 2018-06-21 NOTE — ED NOTES
Pt ambulated to restroom without difficulty. Gait was steady and even. No assistance required. Pt returned to room, monitoring equipment reapplied. Will continue to monitor patient. Patient discharged by MD. Papers given and questions answered. Home with family.

## 2018-06-21 NOTE — ED PROVIDER NOTES
HPI Comments: This patient presents with vomiting and diarrhea over the past 4 days. She has no abdominal pain. She feels very weak and dizzy and thirsty. She has a dry mouth. She has had no temperature but has felt a little feverish at times. She has been on antibiotics over the past 2 months. She has had a history of recurrent UTIs secondary to bladder drop, according to her friend here. She also has been on antibiotics for some L wrist surgery that she had at the beginning of May. There is no blood in either the vomit or the diarrhea. The frequency of both has actually improved. Nausea remains. She also has a biliary stent. She felt burning/frequency with urination about 4 days ago prior to the n/v beginning. Old chart reviewed - in March, she had an ERCP w biliary stenting by Dr Alethea Coffman. Patient is a 48 y.o. female presenting with vomiting and diarrhea. Vomiting    Associated symptoms include diarrhea. Diarrhea    Associated symptoms include diarrhea and vomiting. Past Medical History:   Diagnosis Date    Anti-phospholipid syndrome ([facility] [address] 2005    Calculus of kidney     currently and h/o multiple kidney stones    DDD (degenerative disc disease) age 24    DDD (degenerative disc disease)     Gerldine Nice syndrome     H/O: stroke 2005    History of multiple strokes, PFO-had surgery, star flex in heart to seal hole.     Hypertension     Hypothyroidism     takes Synthoid    Infertility female     multiple miscarriages    Lupus 2005    MS (multiple sclerosis) ([facility] 2005    sees neurologist-Dr. Jonny Waite    Nausea & vomiting     Raynaud disease     Dr Iris Schaefer ( Rheum)     Stroke [facility])     last stroke was in 2005    Unspecified adverse effect of anesthesia     mother, sister and daughter difficult to arouse       Past Surgical History:   Procedure Laterality Date    BIOPSY BREAST      right breast, twice, benign    HX ANKLE FRACTURE TX      Both ankles (1983, 1984) & (1996, 1997)   [address]  December 1997    HX CERVICAL FUSION  09/2011    HX CHOLECYSTECTOMY  1994    HX DILATION AND CURETTAGE      multiple    HX GYN      uterine ablation    HX LITHOTRIPSY      multiple    HX OTHER SURGICAL      bilateral axillary glands removed    HX SHOULDER ARTHROSCOPY      Right Shoulder Surgery twice (1996, 2006)    HX TUBAL LIGATION  1997    [place] REPAIR HEART WOUND  2005    History of multiple strokes, PFO-had surgery, star flex in heart to seal hole. Family History:   Problem Relation Age of Onset    Cancer Mother      emphysema    Cancer Father        Social History     Social History    Marital status:      Spouse name: N/A    Number of children: N/A    Years of education: N/A     Occupational History    Not on file. Social History Main Topics    Smoking status: Never Smoker    Smokeless tobacco: Never Used    Alcohol use No      Comment:      Drug use: No    Sexual activity: Not Currently     Other Topics Concern    Not on file     Social History Narrative         ALLERGIES: Codeine and Imitrex [sumatriptan succinate]    Review of Systems   Gastrointestinal: Positive for diarrhea and vomiting. All other systems reviewed and are negative. Vitals:    06/21/18 1525   BP: 141/83   Pulse: 61   Resp: 16   Temp: 97.6 °F (36.4 °C)   SpO2: 99%   Weight: 72.6 kg (160 lb)   Height: 5' 7\" (1.702 m)            Physical Exam   Constitutional: She appears well-developed and well-nourished. No distress. HENT:   Head: Normocephalic. Dry lips   Eyes: Conjunctivae are normal. Pupils are equal, round, and reactive to light. Neck: No tracheal deviation present. Cardiovascular: Normal rate, regular rhythm, normal heart sounds and intact distal pulses. Pulmonary/Chest: Effort normal and breath sounds normal.   Abdominal: Soft. She exhibits no distension. There is no tenderness. There is no rebound.    Genitourinary:   Genitourinary Comments: No CVAT Musculoskeletal: She exhibits no edema. Neurological: She is alert. Skin: Skin is warm and dry. She is not diaphoretic. Psychiatric: She has a normal mood and affect. MDM      ED Course       Procedures      Doubt this is an issue with the stent    6:12 PM - 2L in - getting po phenergan and iv CTX. Will DC after CTX. Recent Results (from the past 12 hour(s))   METABOLIC PANEL, BASIC    Collection Time: 06/21/18  3:42 PM   Result Value Ref Range    Sodium 139 136 - 145 mmol/L    Potassium 4.1 3.5 - 5.1 mmol/L    Chloride 102 97 - 108 mmol/L    CO2 30 21 - 32 mmol/L    Anion gap 7 5 - 15 mmol/L    Glucose 88 65 - 100 mg/dL    BUN 13 6 - 20 MG/DL    Creatinine 0.66 0.55 - 1.02 MG/DL    BUN/Creatinine ratio 20 12 - 20      GFR est AA >60 >60 ml/min/1.73m2    GFR est non-AA >60 >60 ml/min/1.73m2    Calcium 9.2 8.5 - 10.1 MG/DL   MAGNESIUM    Collection Time: 06/21/18  3:42 PM   Result Value Ref Range    Magnesium 1.5 (L) 1.6 - 2.4 mg/dL   LIPASE    Collection Time: 06/21/18  3:42 PM   Result Value Ref Range    Lipase 179 73 - 393 U/L   HEPATIC FUNCTION PANEL    Collection Time: 06/21/18  3:42 PM   Result Value Ref Range    Protein, total 8.2 6.4 - 8.2 g/dL    Albumin 3.6 3.5 - 5.0 g/dL    Globulin 4.6 (H) 2.0 - 4.0 g/dL    A-G Ratio 0.8 (L) 1.1 - 2.2      Bilirubin, total 0.4 0.2 - 1.0 MG/DL    Bilirubin, direct <0.1 0.0 - 0.2 MG/DL    Alk.  phosphatase 86 45 - 117 U/L    AST (SGOT) 48 (H) 15 - 37 U/L    ALT (SGPT) 42 12 - 78 U/L   CBC WITH AUTOMATED DIFF    Collection Time: 06/21/18  3:42 PM   Result Value Ref Range    WBC 7.9 3.6 - 11.0 K/uL    RBC 4.79 3.80 - 5.20 M/uL    HGB 13.3 11.5 - 16.0 g/dL    HCT 41.4 35.0 - 47.0 %    MCV 86.4 80.0 - 99.0 FL    MCH 27.8 26.0 - 34.0 PG    MCHC 32.1 30.0 - 36.5 g/dL    RDW 13.4 11.5 - 14.5 %    PLATELET 860 813 - 770 K/uL    MPV 8.6 (L) 8.9 - 12.9 FL    NRBC 0.0 0  WBC    ABSOLUTE NRBC 0.00 0.00 - 0.01 K/uL    NEUTROPHILS 66 32 - 75 % LYMPHOCYTES 23 12 - 49 %    MONOCYTES 8 5 - 13 %    EOSINOPHILS 2 0 - 7 %    BASOPHILS 1 0 - 1 %    IMMATURE GRANULOCYTES 0 0.0 - 0.5 %    ABS. NEUTROPHILS 5.2 1.8 - 8.0 K/UL    ABS. LYMPHOCYTES 1.9 0.8 - 3.5 K/UL    ABS. MONOCYTES 0.6 0.0 - 1.0 K/UL    ABS. EOSINOPHILS 0.2 0.0 - 0.4 K/UL    ABS. BASOPHILS 0.0 0.0 - 0.1 K/UL    ABS. IMM. GRANS. 0.0 0.00 - 0.04 K/UL    DF AUTOMATED     URINALYSIS W/MICROSCOPIC    Collection Time: 06/21/18  4:29 PM   Result Value Ref Range    Color DARK YELLOW      Appearance TURBID (A) CLEAR      Specific gravity 1.028 1.003 - 1.030      pH (UA) 6.5 5.0 - 8.0      Protein TRACE (A) NEG mg/dL    Glucose NEGATIVE  NEG mg/dL    Ketone 40 (A) NEG mg/dL    Bilirubin NEGATIVE  NEG      Blood SMALL (A) NEG      Urobilinogen 1.0 0.2 - 1.0 EU/dL    Nitrites POSITIVE (A) NEG      Leukocyte Esterase MODERATE (A) NEG      WBC  0 - 4 /hpf    RBC 5-10 0 - 5 /hpf    Epithelial cells FEW FEW /lpf    Bacteria 4+ (A) NEG /hpf   URINE CULTURE HOLD SAMPLE    Collection Time: 06/21/18  4:29 PM   Result Value Ref Range    Urine culture hold        URINE ON HOLD IN MICROBIOLOGY DEPT FOR 3 DAYS. IF UNPRESERVED URINE IS SUBMITTED, IT CANNOT BE USED FOR ADDITIONAL TESTING AFTER 24 HRS, RECOLLECTION WILL BE REQUIRED. ED EKG interpretation:  Rhythm: sinus bradycardia; and regular . Rate (approx.): 55; Axis: normal; P wave: normal; QRS interval: normal ; ST/T wave: non-specific changes; when compared with ekg from 3-28-18, no acute changes are noted. This EKG was interpreted by Donell Laboy DO,ED Provider.

## 2018-06-21 NOTE — DISCHARGE INSTRUCTIONS
Urinary Tract Infection in Women: Care Instructions  Your Care Instructions    A urinary tract infection, or UTI, is a general term for an infection anywhere between the kidneys and the urethra (where urine comes out). Most UTIs are bladder infections. They often cause pain or burning when you urinate. UTIs are caused by bacteria and can be cured with antibiotics. Be sure to complete your treatment so that the infection goes away. Follow-up care is a key part of your treatment and safety. Be sure to make and go to all appointments, and call your doctor if you are having problems. It's also a good idea to know your test results and keep a list of the medicines you take. How can you care for yourself at home? · Take your antibiotics as directed. Do not stop taking them just because you feel better. You need to take the full course of antibiotics. · Drink extra water and other fluids for the next day or two. This may help wash out the bacteria that are causing the infection. (If you have kidney, heart, or liver disease and have to limit fluids, talk with your doctor before you increase your fluid intake.)  · Avoid drinks that are carbonated or have caffeine. They can irritate the bladder. · Urinate often. Try to empty your bladder each time. · To relieve pain, take a hot bath or lay a heating pad set on low over your lower belly or genital area. Never go to sleep with a heating pad in place. To prevent UTIs  · Drink plenty of water each day. This helps you urinate often, which clears bacteria from your system. (If you have kidney, heart, or liver disease and have to limit fluids, talk with your doctor before you increase your fluid intake.)  · Urinate when you need to. · Urinate right after you have sex. · Change sanitary pads often. · Avoid douches, bubble baths, feminine hygiene sprays, and other feminine hygiene products that have deodorants.   · After going to the bathroom, wipe from front to back.  When should you call for help? Call your doctor now or seek immediate medical care if:  ? · Symptoms such as fever, chills, nausea, or vomiting get worse or appear for the first time. ? · You have new pain in your back just below your rib cage. This is called flank pain. ? · There is new blood or pus in your urine. ? · You have any problems with your antibiotic medicine. ? Watch closely for changes in your health, and be sure to contact your doctor if:  ? · You are not getting better after taking an antibiotic for 2 days. ? · Your symptoms go away but then come back. Where can you learn more? Go to http://bob-charleen.info/. Enter A442 in the search box to learn more about \"Urinary Tract Infection in Women: Care Instructions. \"  Current as of: May 12, 2017  Content Version: 11.4  © 3585-6221 Location. Care instructions adapted under license by [employer] (which disclaims liability or warranty for this information). If you have questions about a medical condition or this instruction, always ask your healthcare professional. [address] any warranty or liability for your use of this information. Oral Rehydration: Care Instructions  Your Care Instructions    Dehydration occurs when your body loses too much water. This can happen if you do not drink enough fluids or lose a lot of fluid due to diarrhea, vomiting, or sweating. Being dehydrated can cause health problems and can even be life-threatening. To replace lost fluids, you need to drink liquid that contains special chemicals called electrolytes. Electrolytes keep your body working well. Plain water does not have electrolytes. You also need to rest to prevent more fluid loss. Replacing water and electrolytes (oral rehydration) completely takes about 36 hours. But you should feel better within a few hours. Follow-up care is a key part of your treatment and safety.  Be sure to make and go to all appointments, and call your doctor if you are having problems. It's also a good idea to know your test results and keep a list of the medicines you take. How can you care for yourself at home? · Take frequent sips of a drink such as Gatorade, Powerade, or other rehydration drinks that your doctor suggests. These replace both fluid and important chemicals (electrolytes) you need for balance in your blood. · Drink 2 quarts of cool liquid over 2 to 4 hours. You should have at least 10 glasses of liquid a day to replace lost fluid. If you have kidney, heart, or liver disease and have to limit fluids, talk with your doctor before you increase the amount of fluids you drink. · Make your own drink. Measure everything carefully. The drink may not work well or may even be harmful if the amounts are off. ¨ 1 quart water  ¨ ½ teaspoon salt  ¨ 6 teaspoons sugar  · Do not drink liquid with caffeine, such as coffee and fidelia. · Do not drink any alcohol. It can make you dehydrated. · Drink plenty of fluids, enough so that your urine is light yellow or clear like water. If you have kidney, heart, or liver disease and have to limit fluids, talk with your doctor before you increase the amount of fluids you drink. When should you call for help? Call 911 anytime you think you may need emergency care. For example, call if:  ? · You have signs of severe dehydration, such as:  ¨ You are confused or unable to stay awake. ¨ You passed out (lost consciousness). ?Call your doctor now or seek immediate medical care if:  ? · You still have signs of dehydration. You have sunken eyes and a dry mouth, and you pass only a little dark urine. ? · You are dizzy or lightheaded, or you feel like you may faint. ? · You are not able to keep down fluids. ? Watch closely for changes in your health, and be sure to contact your doctor if:  ? · You do not get better as expected. Where can you learn more?   Go to http://bob-charleen.info/. Enter I040 in the search box to learn more about \"Oral Rehydration: Care Instructions. \"  Current as of: March 20, 2017  Content Version: 11.4  © 5907-7028 [employer], [employer]. Care instructions adapted under license by [employer] (which disclaims liability or warranty for this information). If you have questions about a medical condition or this instruction, always ask your healthcare professional. [address] any warranty or liability for your use of this information.

## 2018-06-21 NOTE — ED TRIAGE NOTES
\"I just went to my primary care and he sent me here because he thinks I'm dehydrated. I've been throwing up and diarrhea since Sunday. \"

## 2018-06-22 LAB
ATRIAL RATE: 55 BPM
CALCULATED P AXIS, ECG09: 81 DEGREES
CALCULATED R AXIS, ECG10: 16 DEGREES
CALCULATED T AXIS, ECG11: 9 DEGREES
DIAGNOSIS, 93000: NORMAL
P-R INTERVAL, ECG05: 150 MS
Q-T INTERVAL, ECG07: 470 MS
QRS DURATION, ECG06: 92 MS
QTC CALCULATION (BEZET), ECG08: 449 MS
VENTRICULAR RATE, ECG03: 55 BPM

## 2018-06-24 LAB
BACTERIA SPEC CULT: ABNORMAL
CC UR VC: ABNORMAL
SERVICE CMNT-IMP: ABNORMAL

## 2018-08-20 ENCOUNTER — ANESTHESIA (OUTPATIENT)
Dept: ENDOSCOPY | Age: 53
End: 2018-08-20
Payer: COMMERCIAL

## 2018-08-20 ENCOUNTER — HOSPITAL ENCOUNTER (OUTPATIENT)
Age: 53
Setting detail: OUTPATIENT SURGERY
Discharge: HOME | End: 2018-08-20
Attending: INTERNAL MEDICINE | Admitting: INTERNAL MEDICINE
Payer: COMMERCIAL

## 2018-08-20 ENCOUNTER — APPOINTMENT (OUTPATIENT)
Dept: GENERAL RADIOLOGY | Age: 53
End: 2018-08-20
Attending: INTERNAL MEDICINE
Payer: COMMERCIAL

## 2018-08-20 ENCOUNTER — ANESTHESIA EVENT (OUTPATIENT)
Dept: ENDOSCOPY | Age: 53
End: 2018-08-20
Payer: COMMERCIAL

## 2018-08-20 VITALS
DIASTOLIC BLOOD PRESSURE: 84 MMHG | RESPIRATION RATE: 19 BRPM | SYSTOLIC BLOOD PRESSURE: 149 MMHG | OXYGEN SATURATION: 95 % | HEART RATE: 76 BPM | TEMPERATURE: 97.5 F

## 2018-08-20 PROCEDURE — 76040000007: Performed by: INTERNAL MEDICINE

## 2018-08-20 PROCEDURE — C1874 STENT, COATED/COV W/DEL SYS: HCPCS | Performed by: INTERNAL MEDICINE

## 2018-08-20 PROCEDURE — 77030009038 HC CATH BILI STN RTVR BSC -C: Performed by: INTERNAL MEDICINE

## 2018-08-20 PROCEDURE — 74011250636 HC RX REV CODE- 250/636

## 2018-08-20 PROCEDURE — 77030007288 HC DEV LOK BILI BSC -A: Performed by: INTERNAL MEDICINE

## 2018-08-20 PROCEDURE — 77030013992 HC SNR POLYP ENDOSC BSC -B: Performed by: INTERNAL MEDICINE

## 2018-08-20 PROCEDURE — 77030012596 HC SPHNTOM BILI BSC -E: Performed by: INTERNAL MEDICINE

## 2018-08-20 PROCEDURE — 74011636320 HC RX REV CODE- 636/320: Performed by: INTERNAL MEDICINE

## 2018-08-20 PROCEDURE — 76060000032 HC ANESTHESIA 0.5 TO 1 HR: Performed by: INTERNAL MEDICINE

## 2018-08-20 PROCEDURE — 74011250636 HC RX REV CODE- 250/636: Performed by: INTERNAL MEDICINE

## 2018-08-20 RX ORDER — FLUMAZENIL 0.1 MG/ML
0.2 INJECTION INTRAVENOUS
Status: DISCONTINUED | OUTPATIENT
Start: 2018-08-20 | End: 2018-08-20 | Stop reason: HOSPADM

## 2018-08-20 RX ORDER — SODIUM CHLORIDE 0.9 % (FLUSH) 0.9 %
5-10 SYRINGE (ML) INJECTION AS NEEDED
Status: DISCONTINUED | OUTPATIENT
Start: 2018-08-20 | End: 2018-08-20 | Stop reason: HOSPADM

## 2018-08-20 RX ORDER — DEXTROMETHORPHAN/PSEUDOEPHED 2.5-7.5/.8
1.2 DROPS ORAL
Status: DISCONTINUED | OUTPATIENT
Start: 2018-08-20 | End: 2018-08-20 | Stop reason: HOSPADM

## 2018-08-20 RX ORDER — SODIUM CHLORIDE 0.9 % (FLUSH) 0.9 %
5-10 SYRINGE (ML) INJECTION EVERY 8 HOURS
Status: DISCONTINUED | OUTPATIENT
Start: 2018-08-20 | End: 2018-08-20 | Stop reason: HOSPADM

## 2018-08-20 RX ORDER — EPINEPHRINE 0.1 MG/ML
1 INJECTION INTRACARDIAC; INTRAVENOUS
Status: DISCONTINUED | OUTPATIENT
Start: 2018-08-20 | End: 2018-08-20 | Stop reason: HOSPADM

## 2018-08-20 RX ORDER — MIDAZOLAM HYDROCHLORIDE 1 MG/ML
.25-1 INJECTION, SOLUTION INTRAMUSCULAR; INTRAVENOUS
Status: DISCONTINUED | OUTPATIENT
Start: 2018-08-20 | End: 2018-08-20 | Stop reason: HOSPADM

## 2018-08-20 RX ORDER — ONDANSETRON 2 MG/ML
INJECTION INTRAMUSCULAR; INTRAVENOUS AS NEEDED
Status: DISCONTINUED | OUTPATIENT
Start: 2018-08-20 | End: 2018-08-20 | Stop reason: HOSPADM

## 2018-08-20 RX ORDER — SODIUM CHLORIDE 9 MG/ML
INJECTION, SOLUTION INTRAVENOUS
Status: DISCONTINUED | OUTPATIENT
Start: 2018-08-20 | End: 2018-08-20 | Stop reason: HOSPADM

## 2018-08-20 RX ORDER — LIDOCAINE HYDROCHLORIDE 20 MG/ML
INJECTION, SOLUTION EPIDURAL; INFILTRATION; INTRACAUDAL; PERINEURAL AS NEEDED
Status: DISCONTINUED | OUTPATIENT
Start: 2018-08-20 | End: 2018-08-20 | Stop reason: HOSPADM

## 2018-08-20 RX ORDER — NALOXONE HYDROCHLORIDE 0.4 MG/ML
0.4 INJECTION, SOLUTION INTRAMUSCULAR; INTRAVENOUS; SUBCUTANEOUS
Status: DISCONTINUED | OUTPATIENT
Start: 2018-08-20 | End: 2018-08-20 | Stop reason: HOSPADM

## 2018-08-20 RX ORDER — NITROFURANTOIN 25; 75 MG/1; MG/1
100 CAPSULE ORAL 2 TIMES DAILY
COMMUNITY

## 2018-08-20 RX ORDER — FENTANYL CITRATE 50 UG/ML
100 INJECTION, SOLUTION INTRAMUSCULAR; INTRAVENOUS
Status: DISCONTINUED | OUTPATIENT
Start: 2018-08-20 | End: 2018-08-20 | Stop reason: HOSPADM

## 2018-08-20 RX ORDER — ATROPINE SULFATE 0.1 MG/ML
0.5 INJECTION INTRAVENOUS
Status: DISCONTINUED | OUTPATIENT
Start: 2018-08-20 | End: 2018-08-20 | Stop reason: HOSPADM

## 2018-08-20 RX ORDER — SODIUM CHLORIDE 9 MG/ML
50 INJECTION, SOLUTION INTRAVENOUS CONTINUOUS
Status: DISCONTINUED | OUTPATIENT
Start: 2018-08-20 | End: 2018-08-20 | Stop reason: HOSPADM

## 2018-08-20 RX ORDER — PROPOFOL 10 MG/ML
INJECTION, EMULSION INTRAVENOUS AS NEEDED
Status: DISCONTINUED | OUTPATIENT
Start: 2018-08-20 | End: 2018-08-20 | Stop reason: HOSPADM

## 2018-08-20 RX ADMIN — PROPOFOL 30 MG: 10 INJECTION, EMULSION INTRAVENOUS at 13:37

## 2018-08-20 RX ADMIN — ONDANSETRON 4 MG: 2 INJECTION INTRAMUSCULAR; INTRAVENOUS at 13:19

## 2018-08-20 RX ADMIN — LIDOCAINE HYDROCHLORIDE 50 MG: 20 INJECTION, SOLUTION EPIDURAL; INFILTRATION; INTRACAUDAL; PERINEURAL at 13:17

## 2018-08-20 RX ADMIN — PROPOFOL 30 MG: 10 INJECTION, EMULSION INTRAVENOUS at 13:34

## 2018-08-20 RX ADMIN — PROPOFOL 100 MG: 10 INJECTION, EMULSION INTRAVENOUS at 13:17

## 2018-08-20 RX ADMIN — PROPOFOL 30 MG: 10 INJECTION, EMULSION INTRAVENOUS at 13:23

## 2018-08-20 RX ADMIN — ONDANSETRON 4 MG: 2 INJECTION INTRAMUSCULAR; INTRAVENOUS at 12:51

## 2018-08-20 RX ADMIN — PROPOFOL 30 MG: 10 INJECTION, EMULSION INTRAVENOUS at 13:26

## 2018-08-20 RX ADMIN — PROPOFOL 30 MG: 10 INJECTION, EMULSION INTRAVENOUS at 13:20

## 2018-08-20 RX ADMIN — PROPOFOL 30 MG: 10 INJECTION, EMULSION INTRAVENOUS at 13:18

## 2018-08-20 RX ADMIN — SODIUM CHLORIDE: 9 INJECTION, SOLUTION INTRAVENOUS at 12:30

## 2018-08-20 RX ADMIN — PROPOFOL 30 MG: 10 INJECTION, EMULSION INTRAVENOUS at 13:31

## 2018-08-20 RX ADMIN — PROPOFOL 30 MG: 10 INJECTION, EMULSION INTRAVENOUS at 13:28

## 2018-08-20 NOTE — ANESTHESIA POSTPROCEDURE EVALUATION
Post-Anesthesia Evaluation and Assessment    Patient: Alcon Tapia MRN: [id number]  SSN: [id number]    YOB: 1965  Age: 48 y.o. Sex: female       Cardiovascular Function/Vital Signs  Visit Vitals    /84    Pulse 76    Temp 36.4 °C (97.5 °F)    Resp 19    SpO2 95%       Patient is status post general anesthesia for Procedure(s):  ENDOSCOPIC RETROGRADE CHOLANGIOPANCREATOGRAPHY (ERCP)  BILIARY DILATATION  BILIARY STENT PLACEMENT  ENDOSCOPY WITH PROSTHESIS OR STENT REMOVAL. Nausea/Vomiting: None    Postoperative hydration reviewed and adequate. Pain:  Pain Scale 1: Numeric (0 - 10) (08/20/18 1405)  Pain Intensity 1: 0 (08/20/18 1405)   Managed    Neurological Status: At baseline    Mental Status and Level of Consciousness: Arousable    Pulmonary Status:   O2 Device: Room air (08/20/18 1405)   Adequate oxygenation and airway patent    Complications related to anesthesia: None    Post-anesthesia assessment completed.  No concerns    Signed By: Justice Tello MD     August 20, 2018

## 2018-08-20 NOTE — H&P
[address]  [address] [facility] [address] [facility], [address]  [phone]                                History and Physical     NAME: Chyrl Hamman   :  1965   MRN:  [id number]     HPI:  The patient was seen and examined. Past Surgical History:   Procedure Laterality Date    BIOPSY BREAST      right breast, twice, benign    HX ANKLE FRACTURE TX      Both ankles (, ) & (, )   [address]  1997    HX CERVICAL FUSION  2011    HX CHOLECYSTECTOMY      HX DILATION AND CURETTAGE      multiple    HX GYN      uterine ablation    HX LITHOTRIPSY      multiple    HX OTHER SURGICAL      bilateral axillary glands removed    HX SHOULDER ARTHROSCOPY      Right Shoulder Surgery twice (, )   [address] WOUND      History of multiple strokes, PFO-had surgery, star flex in heart to seal hole. Past Medical History:   Diagnosis Date    [facility]-phospholipid syndrome ([facility] [address]     Calculus of kidney     currently and h/o multiple kidney stones    DDD (degenerative disc disease) age 24    DDD (degenerative disc disease)     Rosslyn Polo syndrome     H/O: stroke     History of multiple strokes, PFO-had surgery, star flex in heart to seal hole.     Hypertension     Hypothyroidism     takes Synthoid    Infertility female     multiple miscarriages    Lupus     MS (multiple sclerosis) ([facility] [address]     sees neurologist-Dr. Austen Blankenship    Nausea & vomiting     Raynaud disease     Dr Jose Angel Jeong ( [place])     Stroke [facility])     last stroke was in     Unspecified adverse effect of anesthesia     mother, sister and daughter difficult to arouse     Social History   Substance Use Topics    Smoking status: Never Smoker    Smokeless tobacco: Never Used    Alcohol use No      Comment:       Allergies   Allergen Reactions    Codeine Other (comments)     pain    Imitrex [Sumatriptan Succinate] Other (comments)     Chest pain     Family History   Problem Relation Age of Onset    Cancer Mother      emphysema    Cancer Father      Current Facility-Administered Medications   Medication Dose Route Frequency    glucagon (GLUCAGEN) injection 1 mg  1 mg IntraVENous ONCE         PHYSICAL EXAM:  General: WD, WN. Alert, cooperative, no acute distress    HEENT: NC, Atraumatic. PERRLA, EOMI. Anicteric sclerae. Lungs:  CTA Bilaterally. No Wheezing/Rhonchi/Rales. Heart:  Regular  rhythm,  No murmur, No Rubs, No Gallops  Abdomen: Soft, Non distended, Non tender.  +Bowel sounds, no HSM  Extremities: No c/c/e  Neurologic:  CN 2-12 gi, Alert and oriented X 3. No acute neurological distress   Psych:   Good insight. Not anxious nor agitated. The heart, lungs and mental status were satisfactory for the administration of MAC sedation and for the procedure.       Mallampati score: 3       Assessment:   · Bile duct stricture    Plan:   · Endoscopic procedure  · MAC sedation   ·

## 2018-08-20 NOTE — ROUTINE PROCESS
Chaya Karen  1965  [id number]    Situation:  Verbal report received from: Mariana  Procedure: Procedure(s):  ENDOSCOPIC RETROGRADE CHOLANGIOPANCREATOGRAPHY (ERCP)  BILIARY DILATATION  BILIARY STENT PLACEMENT  ENDOSCOPY WITH PROSTHESIS OR STENT REMOVAL    Background:    Preoperative diagnosis: Biliary Obstruction  Postoperative diagnosis: Bile duct stones, extracted; bile duct stricture    :  Dr. Mckeon Peer  Assistant(s): Endoscopy Technician-1: Yoandy Vick IV  Endoscopy RN-1: Autumn Fernandes RN    Specimens: * No specimens in log *  H. Pylori  no    Assessment:  Intra-procedure medications     Anesthesia gave intra-procedure sedation and medications, see anesthesia flow sheet yes    Intravenous fluids: NS@ KVO     Vital signs stable     Abdominal assessment: round and soft     Recommendation:  Discharge patient per MD order.     Family or Friend   Permission to share finding with family or friend yes

## 2018-08-20 NOTE — PROCEDURES
[address]  [address] [place], [address]   [phone]                   ERCP NOTE    NAME:  Barby Cage   :   1965   MRN:   [id number]     Date/Time:  2018 1:50 PM    Procedure Type:   ERCPwith biliary stone removal, biliary stent change     Indications: biliary obstruction  Pre-operative Diagnosis: see indication above  Post-operative Diagnosis:  See findings below  : Ishaan Smith MD    Referring Provider:     Marisabel Mcdermott MD    Sedation:  MAC anesthesia    Procedure Details:  After informed consent was obtained with all risks and benefits of procedure explained, the patient was taken to the fluoroscopy suite and placed in the prone position. Upon sequential sedation as per above, the Olympus duodenoscope [id number]   was inserted via the mouthpeice and carefully advanced to the second portion of the duodenum. The quality of visualization was good. The duodenoscope was withdrawn into a short position. Findings:   Esophagus: not examined in detail  Stomach: not examined in detail  Duodenum/jejunum: not examined in detail  Ampulla:previous sphincterotomy  protruding stent  Cholangiogram: One plastic biliary stent was removed using a snare. Bile duct was selectively cannulated using Dreamwire. Occlusion cholangiogram was performed using 12-15 mm extraction balloon injection below. A short smooth stricture was seen immediately above the ampulla. This was about 1 cm in length. This has been previously sampled and was negative for malignancy or dysplasia. Common bile duct was diffusely dilated proximal to the stricture with largest diameter about 15 mm. One irregular filing defect was noted in the distal common bile duct. Balloon sweep was performed and few small filing defects were removed. Contrast was not draining well beyond the stricture. Hence, a fully covered biliary metallic stent 8 mm X 60 mm was placed successfully.  Stent was good in position and bile was draining well. Pancreatogram:not performed    Specimen Removed: * No specimens in log *    Complications: None. EBL:  None. Interventions:    Pancreatic: none  Biliary: One plastic biliary stent was removed using a snare. Bile duct was selectively cannulated using Dreamwire. Occlusion cholangiogram was performed using 12-15 mm extraction balloon injection below. A short smooth stricture was seen immediately above the ampulla. This was about 1 cm in length. This has been previously sampled and was negative for malignancy or dysplasia. Common bile duct was diffusely dilated proximal to the stricture with largest diameter about 15 mm. One irregular filing defect was noted in the distal common bile duct. Balloon sweep was performed and few small filing defects were removed. Contrast was not draining well beyond the stricture. Hence, a fully covered biliary metallic stent 8 mm X 60 mm was placed successfully. Stent was good in position and bile was draining well. Impression:    -Bile duct stricture- stent changed  -Bile duct stones-extracted    Recommendations:    Clear liquid diet and advance as tolerated. Resume normal medication(s).     ERCP for stent removal//change in 3-4 months  Watch for complications      Discharge Disposition:  Home following recovery in Endoscopy    Gamal Ely MD  8/20/2018  1:50 PM

## 2018-08-20 NOTE — IP AVS SNAPSHOT
[address] [facility] 13 
[phone] Patient: Megan Brown MRN: [id number] CXK:[id number] About your hospitalization You were admitted on:  August 20, 2018 You last received care in the:  [facility] ENDOSCOPY You were discharged on:  August 20, 2018 Why you were hospitalized Your primary diagnosis was:  Not on File Follow-up Information None Discharge Orders None A check jesús indicates which time of day the medication should be taken. My Medications CONTINUE taking these medications Instructions Each Dose to Equal  
 Morning Noon Evening Bedtime  
 amLODIPine 5 mg tablet Commonly known as:  Luna Croon Your last dose was: Your next dose is: TAKE 1 TABLET EVERY DAY  
     
   
   
   
  
 aspirin 325 mg tablet Commonly known as:  ASPIRIN Your last dose was: Your next dose is: Take 325 mg by mouth daily. 325 mg  
    
   
   
   
  
 citalopram 40 mg tablet Commonly known as:  [place] Your last dose was: Your next dose is: TAKE 1 TABLET BY MOUTH ONCE A DAY  
     
   
   
   
  
 dextroamphetamine-amphetamine 20 mg tablet Commonly known as:  ADDERALL Your last dose was: Your next dose is:    
   
   
 20 mg three (3) times daily. 20 mg  
    
   
   
   
  
 hydroCHLOROthiazide 25 mg tablet Commonly known as:  HYDRODIURIL Your last dose was: Your next dose is: TAKE 1 TABLET BY MOUTH EVERY DAY  
     
   
   
   
  
 * levothyroxine 125 mcg tablet Commonly known as:  SYNTHROID Your last dose was: Your next dose is: Take 125 mcg by mouth Daily (before breakfast). 125 mcg * levothyroxine 200 mcg tablet Commonly known as:  SYNTHROID Your last dose was: Your next dose is:  TAKE 1 TABLET BY MOUTH DAILY  
     
   
   
   
  
 liothyronine 5 mcg tablet Commonly known as:  CYTOMEL Your last dose was: Your next dose is: TAKE 2 TABLETS BY MOUTH EVERY DAY  
     
   
   
   
  
 MACROBID 100 mg capsule Generic drug:  nitrofurantoin (macrocrystal-monohydrate) Your last dose was: Your next dose is: Take 100 mg by mouth two (2) times a day. 100 mg  
    
   
   
   
  
 * ondansetron 4 mg disintegrating tablet Commonly known as:  ZOFRAN ODT Your last dose was: Your next dose is: Take 1 Tab by mouth every eight (8) hours as needed for Nausea. 4 mg * ondansetron 8 mg disintegrating tablet Commonly known as:  ZOFRAN ODT Your last dose was: Your next dose is: Take 1 Tab by mouth every eight (8) hours as needed for Nausea. 8 mg SUBOXONE 8-2 mg Film sublingaul film Generic drug:  buprenorphine-naloxone Your last dose was: Your next dose is:    
   
   
 by SubLINGual route daily. sucralfate 1 gram tablet Commonly known as:  Benna Pa Your last dose was: Your next dose is: Take 1 Tab by mouth four (4) times daily. 1 g  
    
   
   
   
  
 TYLENOL EXTRA STRENGTH 500 mg tablet Generic drug:  acetaminophen Your last dose was: Your next dose is: Take  by mouth every six (6) hours as needed. VIIBRYD PO Your last dose was: Your next dose is: Take 25 mg by mouth. 25 mg  
    
   
   
   
  
 * Notice: This list has 4 medication(s) that are the same as other medications prescribed for you. Read the directions carefully, and ask your doctor or other care provider to review them with you. Opioid Education  Prescription Opioids: What You Need to Know: 
 
Prescription opioids can be used to help relieve moderate-to-severe pain and are often prescribed following a surgery or injury, or for certain health conditions. These medications can be an important part of treatment but also come with serious risks. Opioids are strong pain medicines. Examples include hydrocodone, oxycodone, fentanyl, and morphine. Heroin is an example of an illegal opioid. It is important to work with your health care provider to make sure you are getting the safest, most effective care. WHAT ARE THE RISKS AND SIDE EFFECTS OF OPIOID USE? Prescription opioids carry serious risks of addiction and overdose, especially with prolonged use. An opioid overdose, often marked by slow breathing, can cause sudden death. The use of prescription opioids can have a number of side effects as well, even when taken as directed. · Tolerance-meaning you might need to take more of a medication for the same pain relief · Physical dependence-meaning you have symptoms of withdrawal when the medication is stopped. Withdrawal symptoms can include nausea, sweating, chills, diarrhea, stomach cramps, and muscle aches. Withdrawal can last up to several weeks, depending on which drug you took and how long you took it. · Increased sensitivity to pain · Constipation · Nausea, vomiting, and dry mouth · Sleepiness and dizziness · Confusion · Depression · Low levels of testosterone that can result in lower sex drive, energy, and strength · Itching and sweating RISKS ARE GREATER WITH:      
· History of drug misuse, substance use disorder, or overdose · Mental health conditions (such as depression or anxiety) · Sleep apnea · Older age (72 years or older) · Pregnancy Avoid alcohol while taking prescription opioids. Also, unless specifically advised by your health care provider, medications to avoid include: · Benzodiazepines (such as Xanax or Valium) · Muscle relaxants (such as Soma or Flexeril) · Hypnotics (such as Ambien or Lunesta) · Other prescription opioids KNOW YOUR OPTIONS Talk to your health care provider about ways to manage your pain that don't involve prescription opioids. Some of these options may actually work better and have fewer risks and side effects. Options may include: 
· Pain relievers such as acetaminophen, ibuprofen, and naproxen · Some medications that are also used for depression or seizures · Physical therapy and exercise · Counseling to help patients learn how to cope better with triggers of pain and stress. · Application of heat or cold compress · Massage therapy · Relaxation techniques Be Informed Make sure you know the name of your medication, how much and how often to take it, and its potential risks & side effects. IF YOU ARE PRESCRIBED OPIOIDS FOR PAIN: 
· Never take opioids in greater amounts or more often than prescribed. Remember the goal is not to be pain-free but to manage your pain at a tolerable level. · Follow up with your primary care provider to: · Work together to create a plan on how to manage your pain. · Talk about ways to help manage your pain that don't involve prescription opioids. · Talk about any and all concerns and side effects. · Help prevent misuse and abuse. · Never sell or share prescription opioids · Help prevent misuse and abuse. · Store prescription opioids in a secure place and out of reach of others (this may include visitors, children, friends, and family). · Safely dispose of unused/unwanted prescription opioids: Find your community drug take-back program or your pharmacy mail-back program, or flush them down the toilet, following guidance from the Food and Drug Administration (www.fda.gov/Drugs/ResourcesForYou). · Visit www.cdc.gov/drugoverdose to learn about the risks of opioid abuse and overdose.  
· If you believe you may be struggling with addiction, tell your health care provider and ask for guidance or call [address] [facility] at 2-878-135-HELP. Discharge Instructions Quynh Wyman. 
[address] [facility] [address] [place], [address] 
[phone] DISCHARGE INSTRUCTIONS Brenda Mcdonald [phone] [id number] 
1965 DISCOMFORT: 
Sore throat- throat lozenges or warm salt water gargle 
redness at IV site- apply warm compress to area; if redness or soreness persist- contact your physician Gaseous discomfort- walking, belching will help relieve any discomfort You may not operate a vehicle for 12 hours You may not engage in an occupation involving machinery or appliances for rest of today You may not drink alcoholic beverages for at least 12 hours Avoid making any critical decisions for at least 24 hour DIET You may eat and drink after you leave. You may resume your regular diet  however -  remember your colon is empty and a heavy meal will produce gas. Avoid these foods:  vegetables, fried / greasy foods, carbonated drinks ACTIVITY You may resume your normal daily activities Spend the remainder of the day resting -  avoid any strenuous activity. CALL M.D. ANY SIGN OF Increasing pain, nausea, vomiting Abdominal distension (swelling) New increased bleeding (oral or rectal) Fever (chills) Pain in chest area Bloody discharge from nose or mouth Shortness of breath Follow-up Instructions: 
 Call Dr. Rogers for any questions or problems. If we took a biopsy please call the office within 2 weeks to discuss your                             pathology results. Telephone # [phone] Continue same medications. Introducing \[facility]\"" & HEALTH SERVICES! [facility] introduces [employer] patient portal. Now you can access parts of your medical record, email your doctor's office, and request medication refills online. 1. In your internet browser, go to [email or web address] 2.  Click on the First Time User? Click Here link in the Sign In box. You will see the New Member Sign Up page. 3. Enter your Well[employer] Access Code exactly as it appears below. You will not need to use this code after youve completed the sign-up process. If you do not sign up before the expiration date, you must request a new code. · Well[employer] Access Code: [email or web address] Expires: 9/19/2018  3:24 PM 
 
4. Enter the last four digits of your Social Security Number (xxxx) and Date of Birth (mm/dd/yyyy) as indicated and click Submit. You will be taken to the next sign-up page. 5. Create a Wellpepper ID. This will be your Wellpepper login ID and cannot be changed, so think of one that is secure and easy to remember. 6. Create a Well[employer] password. You can change your password at any time. 7. Enter your Password Reset Question and Answer. This can be used at a later time if you forget your password. 8. Enter your e-mail address. You will receive e-mail notification when new information is available in [address] 9. Click Sign Up. You can now view and download portions of your medical record. 10. Click the Download Summary menu link to download a portable copy of your medical information. If you have questions, please visit the Frequently Asked Questions section of the Wellpepper website. Remember, Wellpepper is NOT to be used for urgent needs. For medical emergencies, dial 911. Now available from your iPhone and Android! Introducing Sal [employer] As a [facility] patient, I wanted to make you aware of our electronic visit tool called Sal [employer]. [facility] 24/7 allows you to connect within minutes with a medical provider 24 hours a day, seven days a week via a mobile device or tablet or logging into a secure website from your computer. You can access Sal Ng from anywhere in the [place].  
 
A virtual visit might be right for you when you have a simple condition and feel like you just dont want to get out of bed, or cant get away from work for an appointment, when your regular [facility] provider is not available (evenings, weekends or holidays), or when youre out of town and need minor care. Electronic visits cost only $49 and if the [facility] 24/7 provider determines a prescription is needed to treat your condition, one can be electronically transmitted to a nearby pharmacy*. Please take a moment to enroll today if you have not already done so. The enrollment process is free and takes just a few minutes. To enroll, please download the [employer] 24/7 mohini to your tablet or phone, or visit [email or web address] to enroll on your computer. And, as an [address] [place] [address] patient with a [employer] account, the results of your visits will be scanned into your electronic medical record and your primary care provider will be able to view the scanned results. We urge you to continue to see your regular [facility] provider for your ongoing medical care. And while your primary care provider may not be the one available when you seek a [employer] virtual visit, the peace of mind you get from getting a real diagnosis real time can be priceless. For more information on [employer], view our Frequently Asked Questions (FAQs) at [email or web address]. Sincerely, 
 
Mike Hammer MD 
Chief Medical Officer Yandy Conklin *:  certain medications cannot be prescribed via [employer] Unresulted Labs-Please follow up with your PCP about these lab tests Order Current Status XR ENDO ERCP COMP BILIARY PANC SI In process Providers Seen During Your Hospitalization Provider Specialty Primary office phone Kike Ott MD Gastroenterology [phone] Your Primary Care Physician (PCP) Primary Care Physician Office Phone Office Fax Sheela Toussaint [phone] You are allergic to the following Allergen Reactions Codeine Other (comments)  
 pain Imitrex (Sumatriptan Succinate) Other (comments) Chest pain Recent Documentation OB Status Smoking Status Ablation Never Smoker Emergency Contacts Name Discharge Info Relation Home Work Mobile Donaldo Pedro DISCHARGE CAREGIVER [3] Boyfriend [17] [phone] Anna Tamiko CAREGIVER [3] Friend [5] [phone] Patient Belongings The following personal items are in your possession at time of discharge: 
  Dental Appliances: Uppers Please provide this summary of care documentation to your next provider. Signatures-by signing, you are acknowledging that this After Visit Summary has been reviewed with you and you have received a copy. Patient Signature:  ____________________________________________________________ Date:  ____________________________________________________________  
  
Janyth Mins Provider Signature:  ____________________________________________________________ Date:  ____________________________________________________________

## 2018-08-20 NOTE — ANESTHESIA PREPROCEDURE EVALUATION
Anesthetic History     PONV          Review of Systems / Medical History  Patient summary reviewed, nursing notes reviewed and pertinent labs reviewed    Pulmonary                   Neuro/Psych       CVA  TIA     Cardiovascular    Hypertension                   GI/Hepatic/Renal                Endo/Other      Hypothyroidism  Arthritis     Other Findings   Comments:  Pt on suboxone         Physical Exam    Airway  Mallampati: I  TM Distance: > 6 cm  Neck ROM: normal range of motion   Mouth opening: Normal     Cardiovascular    Rhythm: regular  Rate: normal         Dental  No notable dental hx       Pulmonary  Breath sounds clear to auscultation               Abdominal         Other Findings            Anesthetic Plan    ASA: 3  Anesthesia type: MAC          Induction: Intravenous  Anesthetic plan and risks discussed with: Patient

## 2018-08-20 NOTE — DISCHARGE INSTRUCTIONS
[address] [facility].  [address] [facility] [address]  [address] [place] [address]   [phone]                     DISCHARGE INSTRUCTIONS    Storm Castellano  [id number]  1965    DISCOMFORT:  Sore throat- throat lozenges or warm salt water gargle  redness at IV site- apply warm compress to area; if redness or soreness persist- contact your physician  Gaseous discomfort- walking, belching will help relieve any discomfort  You may not operate a vehicle for 12 hours  You may not engage in an occupation involving machinery or appliances for rest of today  You may not drink alcoholic beverages for at least 12 hours  Avoid making any critical decisions for at least 24 hour  DIET  You may eat and drink after you leave. You may resume your regular diet - however -  remember your colon is empty and a heavy meal will produce gas. Avoid these foods:  vegetables, fried / greasy foods, carbonated drinks    ACTIVITY  You may resume your normal daily activities   Spend the remainder of the day resting -  avoid any strenuous activity. CALL M.D. ANY SIGN OF   Increasing pain, nausea, vomiting  Abdominal distension (swelling)  New increased bleeding (oral or rectal)  Fever (chills)  Pain in chest area  Bloody discharge from nose or mouth  Shortness of breath    Follow-up Instructions:   Call Dr. Johnny Patten for any questions or problems. If we took a biopsy please call the office within 2 weeks to discuss your                             pathology results. Telephone # [phone]        Continue same medications.

## 2018-08-31 ENCOUNTER — APPOINTMENT (OUTPATIENT)
Dept: GENERAL RADIOLOGY | Age: 53
End: 2018-08-31
Attending: EMERGENCY MEDICINE
Payer: COMMERCIAL

## 2018-08-31 ENCOUNTER — HOSPITAL ENCOUNTER (EMERGENCY)
Age: 53
Discharge: HOME | End: 2018-08-31
Attending: EMERGENCY MEDICINE
Payer: COMMERCIAL

## 2018-08-31 VITALS
DIASTOLIC BLOOD PRESSURE: 72 MMHG | WEIGHT: 160 LBS | SYSTOLIC BLOOD PRESSURE: 154 MMHG | RESPIRATION RATE: 16 BRPM | HEIGHT: 67 IN | OXYGEN SATURATION: 96 % | TEMPERATURE: 98.3 F | HEART RATE: 67 BPM | BODY MASS INDEX: 25.11 KG/M2

## 2018-08-31 DIAGNOSIS — S93.401A SPRAIN OF RIGHT ANKLE, UNSPECIFIED LIGAMENT, INITIAL ENCOUNTER: Primary | ICD-10-CM

## 2018-08-31 PROCEDURE — 99283 EMERGENCY DEPT VISIT LOW MDM: CPT

## 2018-08-31 PROCEDURE — L1930 AFO PLASTIC: HCPCS

## 2018-08-31 PROCEDURE — 73610 X-RAY EXAM OF ANKLE: CPT

## 2018-08-31 NOTE — DISCHARGE INSTRUCTIONS
ICE, ELEVATE AND DO NOT APPLY WEIGHT ON THE RIGHT LEG    TAKE NAPROXEN FOR ANTIINFLAMMATORY AND PAIN    CALL TO SCHEDULE AN APPOINTMENT WITH ORTHOPEDICS. Ankle Sprain: Care Instructions  Your Care Instructions    An ankle sprain can happen when you twist your ankle. The ligaments that support the ankle can get stretched and torn. Often the ankle is swollen and painful. Ankle sprains may take from several weeks to several months to heal. Usually, the more pain and swelling you have, the more severe your ankle sprain is and the longer it will take to heal. You can heal faster and regain strength in your ankle with good home treatment. It is very important to give your ankle time to heal completely, so that you do not easily hurt your ankle again. Follow-up care is a key part of your treatment and safety. Be sure to make and go to all appointments, and call your doctor if you are having problems. It's also a good idea to know your test results and keep a list of the medicines you take. How can you care for yourself at home? · Prop up your foot on pillows as much as possible for the next 3 days. Try to keep your ankle above the level of your heart. This will help reduce the swelling. · Follow your doctor's directions for wearing a splint or elastic bandage. Wrapping the ankle may help reduce or prevent swelling. · Your doctor may give you a splint, a brace, an air stirrup, or another form of ankle support to protect your ankle until it is healed. Wear it as directed while your ankle is healing. Do not remove it unless your doctor tells you to. After your ankle has healed, ask your doctor whether you should wear the brace when you exercise. · Put ice or cold packs on your injured ankle for 10 to 20 minutes at a time. Try to do this every 1 to 2 hours for the next 3 days (when you are awake) or until the swelling goes down. Put a thin cloth between the ice and your skin.   · You may need to use crutches until you can walk without pain. If you do use crutches, try to bear some weight on your injured ankle if you can do so without pain. This helps the ankle heal.  · Take pain medicines exactly as directed. ¨ If the doctor gave you a prescription medicine for pain, take it as prescribed. ¨ If you are not taking a prescription pain medicine, ask your doctor if you can take an over-the-counter medicine. · If you have been given ankle exercises to do at home, do them exactly as instructed. These can promote healing and help prevent lasting weakness. When should you call for help? Call your doctor now or seek immediate medical care if:    · Your pain is getting worse.     · Your swelling is getting worse.     · Your splint feels too tight or you are unable to loosen it.    Watch closely for changes in your health, and be sure to contact your doctor if:    · You are not getting better after 1 week. Where can you learn more? Go to http://bobThe Daily Musecharleen.info/. Enter S347 in the search box to learn more about \"Ankle Sprain: Care Instructions. \"  Current as of: November 29, 2017  Content Version: 11.7  © 0562-7623 [employer]. Care instructions adapted under license by [employer] (which disclaims liability or warranty for this information). If you have questions about a medical condition or this instruction, always ask your healthcare professional. Juan Jacobo any warranty or liability for your use of this information. Learning About How to Use Crutches  Your Care Instructions  Crutches can help you walk when you have an injured hip, leg, knee, ankle, or foot. Your doctor will tell you how much weight-if any-you can put on your leg. Be sure your crutches fit you. When you stand up in your normal posture, there should be space for two or three fingers between the top of the crutch and your armpit.  When you let your hands hang down, the hand  should be at your wrists. When you put your hands on the hand , your elbows should be slightly bent. To stay safe when using crutches:  · Look straight ahead, not down at your feet. · Clear away small rugs, cords, or anything else that could cause you to trip, slip, or fall. · Be very careful around pets and small children. They can get in your path when you least expect it. · Be sure the rubber tips on your crutches are clean and in good condition to help prevent slipping. · Avoid slick conditions, such as wet floors and snowy or icy driveways. In bad weather, be especially careful on curbs and steps. How to use crutches  Getting ready to walk    1. Bend your elbows slightly. Press the padded top parts of the crutches against your sides, under your armpits. 2. If you have been told not to put any weight on your injured leg, keep that leg bent and off the ground. Walking with crutches    1. Put both crutches about 12 inches in front of you. 2. Put your weight on the handgrips, not on the pads under your arms. (Constant pressure against your underarms can cause numbness.) Swing your body forward. (If you have been told not to put any weight on your injured leg, keep that leg bent and off the ground.)  3. To complete the step, put your weight on the strong leg. 4. Move your crutches about 12 inches in front of you, and start the next step. 5. When you're confident using the crutches, you can move the crutches and your injured leg at the same time. Then push straight down on the crutches as you step past the crutches with your strong leg, as you would in normal walking. 6. Take small steps. 7. Use ramps and elevators when you can. Sitting down    1. To sit, back up to the chair. Use one hand to hold both crutches by the handgrips, beside your injured leg. With the other hand, hold onto the seat and slowly lower yourself onto the chair. 2. Lay the crutches on the ground near your chair.  If you prop them up, they may fall over. Getting up from a chair    1. To get up from a chair,  the crutches and put them in one hand beside your injured leg. 2. Put your weight on the handgrips of the crutches and on your strong leg to stand up. Walking up stairs    1. To go up stairs, step up with your strong leg and then bring the crutches and your injured leg to the upper step. 2. For stairs that have handrails: Put both crutches under the arm opposite the handrail. Use the hand opposite the handrail to hold both crutches by the handgrips. 3. Hold onto the handrail as you go up. Put your strong leg on the step first when you go up. Walking down stairs    1. To go down stairs, put your crutches and injured leg on the lower step. 2. Bring your strong leg to the lower step. This saying may help you remember: \"Up with the good, down with the bad. \"  3. For stairs that have handrails: Put both crutches under the arm opposite the handrail. Use the hand opposite the handrail to hold both crutches by the handgrips. Hold onto the handrail as you go down. Follow the same process you use for stairs: Lead with your crutches and injured leg on the way down. Follow-up care is a key part of your treatment and safety. Be sure to make and go to all appointments, and call your doctor if you are having problems. It's also a good idea to know your test results and keep a list of the medicines you take. Where can you learn more? Go to http://bob-charleen.info/. Enter T259 in the search box to learn more about \"Learning About How to Use Crutches. \"  Current as of: November 29, 2017  Content Version: 11.7  © 2275-4135 [employer]. Care instructions adapted under license by [employer] (which disclaims liability or warranty for this information).  If you have questions about a medical condition or this instruction, always ask your healthcare professional. [address] any warranty or liability for your use of this information.

## 2018-08-31 NOTE — ED TRIAGE NOTES
Pt ambulates to treatment area with a steady gait she states that about a week ago she thinks she injured her right ankle while walking in her flip flops. Over the past couple of day the pain has increased on the inner side of the ankle. She took some Aleve that seemed to help but it is still hurting.

## 2018-08-31 NOTE — ED NOTES
Pt given discharge instructions by Dr Artur Monae she verbalizes an understanding pt stable at time of discharge

## 2018-08-31 NOTE — ED PROVIDER NOTES
HPI Comments: 49-year-old female with history of multiple medical problems as well as reconstruction of her right ankle ×2 here with right ankle pain and swelling. Patient states that she was walking in flip-flops about 1 week ago and tripped and twisted her left right ankle.  She has been taking Aleve with some relief but still hurting.  Her orthopedist is at [address] [place] [address] Street is now retired but she plans to follow-up there. Dami Sanchez has not applied ice.  Walking on the right leg makes it more painful. Berna Barker is currently 6 out of 10.  She does not want any other pain medication at this time.  Denies any other complaints or injuries.  No fevers. Social history: Non-smoker.  No alcohol or drug use. The history is provided by the patient. Past Medical History:   Diagnosis Date    Anti-phospholipid syndrome ([facility] [address] 2005    Calculus of kidney     currently and h/o multiple kidney stones    DDD (degenerative disc disease) age 24    DDD (degenerative disc disease)     Elray Birch syndrome     H/O: stroke 2005    History of multiple strokes, PFO-had surgery, star flex in heart to seal hole.     Hypertension     Hypothyroidism     takes Synthoid    Infertility female     multiple miscarriages    Lupus 2005    MS (multiple sclerosis) ([facility] [address] 2005    sees neurologist-Dr. Nir Scott    Nausea & vomiting     Raynaud disease     Dr Shoaib Gavin ( Rheum)     Stroke [facility])     last stroke was in 2005    Unspecified adverse effect of anesthesia     mother, sister and daughter difficult to arouse       Past Surgical History:   Procedure Laterality Date    BIOPSY BREAST      right breast, twice, benign    HX ANKLE FRACTURE TX      Both ankles (1983, 1984) & (1996, 1997)   [address]  December 1997    HX CERVICAL FUSION  09/2011    HX CHOLECYSTECTOMY  1994    HX DILATION AND CURETTAGE      multiple    HX GYN      uterine ablation    HX LITHOTRIPSY      multiple    HX OTHER SURGICAL      bilateral axillary glands removed    HX SHOULDER ARTHROSCOPY      Right Shoulder Surgery twice (1996, 2006)   [address] HEART WOUND  2005    History of multiple strokes, PFO-had surgery, star flex in heart to seal hole. Family History:   Problem Relation Age of Onset    Cancer Mother      emphysema    Cancer Father        Social History     Social History    Marital status:      Spouse name: N/A    Number of children: N/A    Years of education: N/A     Occupational History    Not on file. Social History Main Topics    Smoking status: Never Smoker    Smokeless tobacco: Never Used    Alcohol use No      Comment:      Drug use: No    Sexual activity: Not Currently     Other Topics Concern    Not on file     Social History Narrative         ALLERGIES: Codeine and Imitrex [sumatriptan succinate]    Review of Systems   Constitutional: Negative for chills and fever. Musculoskeletal: Positive for arthralgias, gait problem and joint swelling. Skin: Negative for wound. Vitals:    08/31/18 1025   BP: 154/72   Pulse: 67   Resp: 16   Temp: 98.3 °F (36.8 °C)   SpO2: 96%   Weight: 72.6 kg (160 lb)   Height: 5' 7\" (1.702 m)            Physical Exam   Constitutional: She is oriented to person, place, and time. She appears well-developed and well-nourished. No distress. HENT:   Head: Normocephalic and atraumatic. Neck: Normal range of motion. Neck supple. Pulmonary/Chest: Effort normal.   Musculoskeletal:        Feet:    Neurological: She is alert and oriented to person, place, and time. She exhibits normal muscle tone. Skin: Skin is warm and dry. Nursing note and vitals reviewed.        MDM  Number of Diagnoses or Management Options  Sprain of right ankle, unspecified ligament, initial encounter:   Diagnosis management comments: 59-year-old female here with right ankle injury.  X-ray negative.  Will apply crutches and an ankle splint.  Continue naproxen.  She plans to follow-up at [address] where she has previously been seen and operated on. ED Course       Procedures         11:43 AM  Patient's results have been reviewed with them. Patient and/or family have verbally conveyed their understanding and agreement of the patient's signs, symptoms, diagnosis, treatment and prognosis and additionally agree to follow up as recommended or return to the Emergency Room should their condition change prior to follow-up. Discharge instructions have also been provided to the patient with some educational information regarding their diagnosis as well a list of reasons why they would want to return to the ER prior to their follow-up appointment should their condition change. No results found for this or any previous visit (from the past 24 hour(s)). Xr Ankle Rt Min 3 V    Result Date: 8/31/2018  EXAM:  XR ANKLE RT MIN 3 V INDICATION:  \"inner ankle pain x 1 week\". COMPARISON: Number 7 [id number]. FINDINGS: Three views of the right ankle demonstrate no fracture or disruption of the ankle mortise. There is no other acute osseous or articular abnormality. A surgical anchor is again seen in the lateral aspect of the talus. Soft tissue calcification is noted in the lateral aspect of the ankle. There is mild diffuse soft tissue swelling. IMPRESSION: No acute osseous or articular abnormality. Mild soft tissue swelling. Soft tissue calcification in the lateral ankle.

## 2018-11-21 ENCOUNTER — ANESTHESIA (OUTPATIENT)
Dept: ENDOSCOPY | Age: 53
End: 2018-11-21
Payer: MEDICAID

## 2018-11-21 ENCOUNTER — HOSPITAL ENCOUNTER (OUTPATIENT)
Age: 53
Setting detail: OUTPATIENT SURGERY
Discharge: HOME | End: 2018-11-21
Attending: INTERNAL MEDICINE | Admitting: INTERNAL MEDICINE
Payer: MEDICAID

## 2018-11-21 ENCOUNTER — APPOINTMENT (OUTPATIENT)
Dept: GENERAL RADIOLOGY | Age: 53
End: 2018-11-21
Attending: INTERNAL MEDICINE
Payer: MEDICAID

## 2018-11-21 ENCOUNTER — ANESTHESIA EVENT (OUTPATIENT)
Dept: ENDOSCOPY | Age: 53
End: 2018-11-21
Payer: MEDICAID

## 2018-11-21 VITALS
TEMPERATURE: 98 F | WEIGHT: 170 LBS | BODY MASS INDEX: 26.68 KG/M2 | RESPIRATION RATE: 14 BRPM | HEIGHT: 67 IN | DIASTOLIC BLOOD PRESSURE: 90 MMHG | SYSTOLIC BLOOD PRESSURE: 125 MMHG | OXYGEN SATURATION: 96 %

## 2018-11-21 PROCEDURE — 74011000250 HC RX REV CODE- 250

## 2018-11-21 PROCEDURE — 74011636320 HC RX REV CODE- 636/320: Performed by: INTERNAL MEDICINE

## 2018-11-21 PROCEDURE — 77030009038 HC CATH BILI STN RTVR BSC -C: Performed by: INTERNAL MEDICINE

## 2018-11-21 PROCEDURE — 77030007288 HC DEV LOK BILI BSC -A: Performed by: INTERNAL MEDICINE

## 2018-11-21 PROCEDURE — 74011250636 HC RX REV CODE- 250/636

## 2018-11-21 PROCEDURE — 77030012596 HC SPHNTOM BILI BSC -E: Performed by: INTERNAL MEDICINE

## 2018-11-21 PROCEDURE — 76040000007: Performed by: INTERNAL MEDICINE

## 2018-11-21 PROCEDURE — 76060000032 HC ANESTHESIA 0.5 TO 1 HR: Performed by: INTERNAL MEDICINE

## 2018-11-21 RX ORDER — GLYCOPYRROLATE 0.2 MG/ML
INJECTION INTRAMUSCULAR; INTRAVENOUS AS NEEDED
Status: DISCONTINUED | OUTPATIENT
Start: 2018-11-21 | End: 2018-11-21 | Stop reason: HOSPADM

## 2018-11-21 RX ORDER — SODIUM CHLORIDE 9 MG/ML
50 INJECTION, SOLUTION INTRAVENOUS CONTINUOUS
Status: DISCONTINUED | OUTPATIENT
Start: 2018-11-21 | End: 2018-11-21 | Stop reason: HOSPADM

## 2018-11-21 RX ORDER — ATROPINE SULFATE 0.1 MG/ML
0.5 INJECTION INTRAVENOUS
Status: DISCONTINUED | OUTPATIENT
Start: 2018-11-21 | End: 2018-11-21 | Stop reason: HOSPADM

## 2018-11-21 RX ORDER — SODIUM CHLORIDE 0.9 % (FLUSH) 0.9 %
5-10 SYRINGE (ML) INJECTION EVERY 8 HOURS
Status: DISCONTINUED | OUTPATIENT
Start: 2018-11-21 | End: 2018-11-21 | Stop reason: HOSPADM

## 2018-11-21 RX ORDER — LIDOCAINE HYDROCHLORIDE 20 MG/ML
INJECTION, SOLUTION EPIDURAL; INFILTRATION; INTRACAUDAL; PERINEURAL AS NEEDED
Status: DISCONTINUED | OUTPATIENT
Start: 2018-11-21 | End: 2018-11-21 | Stop reason: HOSPADM

## 2018-11-21 RX ORDER — ONDANSETRON 2 MG/ML
INJECTION INTRAMUSCULAR; INTRAVENOUS AS NEEDED
Status: DISCONTINUED | OUTPATIENT
Start: 2018-11-21 | End: 2018-11-21 | Stop reason: HOSPADM

## 2018-11-21 RX ORDER — FENTANYL CITRATE 50 UG/ML
100 INJECTION, SOLUTION INTRAMUSCULAR; INTRAVENOUS
Status: DISCONTINUED | OUTPATIENT
Start: 2018-11-21 | End: 2018-11-21 | Stop reason: HOSPADM

## 2018-11-21 RX ORDER — MIDAZOLAM HYDROCHLORIDE 1 MG/ML
.25-1 INJECTION, SOLUTION INTRAMUSCULAR; INTRAVENOUS
Status: DISCONTINUED | OUTPATIENT
Start: 2018-11-21 | End: 2018-11-21 | Stop reason: HOSPADM

## 2018-11-21 RX ORDER — DEXTROMETHORPHAN/PSEUDOEPHED 2.5-7.5/.8
1.2 DROPS ORAL
Status: DISCONTINUED | OUTPATIENT
Start: 2018-11-21 | End: 2018-11-21 | Stop reason: HOSPADM

## 2018-11-21 RX ORDER — PROPOFOL 10 MG/ML
INJECTION, EMULSION INTRAVENOUS AS NEEDED
Status: DISCONTINUED | OUTPATIENT
Start: 2018-11-21 | End: 2018-11-21 | Stop reason: HOSPADM

## 2018-11-21 RX ORDER — EPINEPHRINE 0.1 MG/ML
1 INJECTION INTRACARDIAC; INTRAVENOUS
Status: DISCONTINUED | OUTPATIENT
Start: 2018-11-21 | End: 2018-11-21 | Stop reason: HOSPADM

## 2018-11-21 RX ORDER — NALOXONE HYDROCHLORIDE 0.4 MG/ML
0.4 INJECTION, SOLUTION INTRAMUSCULAR; INTRAVENOUS; SUBCUTANEOUS
Status: DISCONTINUED | OUTPATIENT
Start: 2018-11-21 | End: 2018-11-21 | Stop reason: HOSPADM

## 2018-11-21 RX ORDER — SODIUM CHLORIDE 0.9 % (FLUSH) 0.9 %
5-10 SYRINGE (ML) INJECTION AS NEEDED
Status: DISCONTINUED | OUTPATIENT
Start: 2018-11-21 | End: 2018-11-21 | Stop reason: HOSPADM

## 2018-11-21 RX ORDER — FLUMAZENIL 0.1 MG/ML
0.2 INJECTION INTRAVENOUS
Status: DISCONTINUED | OUTPATIENT
Start: 2018-11-21 | End: 2018-11-21 | Stop reason: HOSPADM

## 2018-11-21 RX ORDER — SODIUM CHLORIDE 9 MG/ML
INJECTION, SOLUTION INTRAVENOUS
Status: DISCONTINUED | OUTPATIENT
Start: 2018-11-21 | End: 2018-11-21 | Stop reason: HOSPADM

## 2018-11-21 RX ADMIN — PROPOFOL 40 MG: 10 INJECTION, EMULSION INTRAVENOUS at 11:37

## 2018-11-21 RX ADMIN — LIDOCAINE HYDROCHLORIDE 60 MG: 20 INJECTION, SOLUTION EPIDURAL; INFILTRATION; INTRACAUDAL; PERINEURAL at 11:28

## 2018-11-21 RX ADMIN — PROPOFOL 30 MG: 10 INJECTION, EMULSION INTRAVENOUS at 11:31

## 2018-11-21 RX ADMIN — PROPOFOL 50 MG: 10 INJECTION, EMULSION INTRAVENOUS at 11:29

## 2018-11-21 RX ADMIN — PROPOFOL 20 MG: 10 INJECTION, EMULSION INTRAVENOUS at 11:32

## 2018-11-21 RX ADMIN — SODIUM CHLORIDE: 9 INJECTION, SOLUTION INTRAVENOUS at 11:10

## 2018-11-21 RX ADMIN — PROPOFOL 50 MG: 10 INJECTION, EMULSION INTRAVENOUS at 11:30

## 2018-11-21 RX ADMIN — GLYCOPYRROLATE 0.1 MG: 0.2 INJECTION INTRAMUSCULAR; INTRAVENOUS at 11:30

## 2018-11-21 RX ADMIN — PROPOFOL 50 MG: 10 INJECTION, EMULSION INTRAVENOUS at 11:28

## 2018-11-21 RX ADMIN — PROPOFOL 40 MG: 10 INJECTION, EMULSION INTRAVENOUS at 11:34

## 2018-11-21 RX ADMIN — PROPOFOL 20 MG: 10 INJECTION, EMULSION INTRAVENOUS at 11:36

## 2018-11-21 RX ADMIN — ONDANSETRON 4 MG: 2 INJECTION INTRAMUSCULAR; INTRAVENOUS at 11:35

## 2018-11-21 RX ADMIN — IOPAMIDOL 5 ML: 612 INJECTION, SOLUTION INTRAVENOUS at 11:41

## 2018-11-21 NOTE — ANESTHESIA PREPROCEDURE EVALUATION
Anesthetic History   No history of anesthetic complications            Review of Systems / Medical History  Patient summary reviewed, nursing notes reviewed and pertinent labs reviewed    Pulmonary  Within defined limits                 Neuro/Psych   Within defined limits    CVA  Neuromuscular disease     Cardiovascular  Within defined limits  Hypertension                   GI/Hepatic/Renal  Within defined limits              Endo/Other  Within defined limits    Hypothyroidism  Arthritis     Other Findings              Physical Exam    Airway  Mallampati: II  TM Distance: > 6 cm  Neck ROM: normal range of motion   Mouth opening: Normal     Cardiovascular  Regular rate and rhythm,  S1 and S2 normal,  no murmur, click, rub, or gallop             Dental  No notable dental hx       Pulmonary  Breath sounds clear to auscultation               Abdominal  GI exam deferred       Other Findings            Anesthetic Plan    ASA: 3  Anesthesia type: MAC          Induction: Intravenous  Anesthetic plan and risks discussed with: Patient

## 2018-11-21 NOTE — H&P
[address] [facility] [address]  [address]  [place], [address]  [phone]                                History and Physical     NAME: Inna Cali   :  1965   MRN:  [id number]     HPI:  The patient was seen and examined. Past Surgical History:   Procedure Laterality Date    BIOPSY BREAST      right breast, twice, benign    HX ANKLE FRACTURE TX      Both ankles (, ) & (, )   [address]  1997    HX CERVICAL FUSION  2011    HX CHOLECYSTECTOMY      HX DILATION AND CURETTAGE      multiple    HX GYN      uterine ablation    HX LITHOTRIPSY      multiple    HX OTHER SURGICAL      bilateral axillary glands removed    HX SHOULDER ARTHROSCOPY      Right Shoulder Surgery twice (, )   [address]    HX UROLOGICAL  2018    bladder and uterine repair    [address] [facility] WOUND      History of multiple strokes, PFO-had surgery, star flex in heart to seal hole. Past Medical History:   Diagnosis Date    Anti-phospholipid syndrome ([address]     DDD (degenerative disc disease) age 24    Ehler Danlos syndrome     joint laxity    H/O: stroke     History of multiple strokes, PFO-had surgery, star flex in heart to seal hole.     Hypertension     Hypothyroidism     takes Synthoid    Infertility female     multiple miscarriages    Lupus     MS (multiple sclerosis) ([address] 2005     neurologist-Dr. Jenn Bowen, fatigue, no focal deficits    Nausea & vomiting     Nephrolithiasis     Raynaud disease     Dr Gutiérrez Laser ( Rheum)     Stroke [facility])     last stroke was in     Unspecified adverse effect of anesthesia     mother, sister and daughter difficult to arouse     Social History     Tobacco Use    Smoking status: Never Smoker    Smokeless tobacco: Never Used   Substance Use Topics    Alcohol use: No     Comment:      Drug use: No     Allergies   Allergen Reactions    Codeine Other (comments)     pain    Imitrex [Sumatriptan Succinate] Other (comments)     Chest pain     Family History   Problem Relation Age of Onset    Cancer Mother         emphysema    Cancer Father      Current Facility-Administered Medications   Medication Dose Route Frequency    iopamidol (ISOVUE 300) 61 % contrast injection        0.9% sodium chloride infusion  50 mL/hr IntraVENous CONTINUOUS    sodium chloride (NS) flush 5-10 mL  5-10 mL IntraVENous Q8H    sodium chloride (NS) flush 5-10 mL  5-10 mL IntraVENous PRN    midazolam (VERSED) injection 0.25-10 mg  0.25-10 mg IntraVENous Multiple    fentaNYL citrate (PF) injection 100 mcg  100 mcg IntraVENous MULTIPLE DOSE GIVEN    naloxone (NARCAN) injection 0.4 mg  0.4 mg IntraVENous Multiple    flumazenil (ROMAZICON) 0.1 mg/mL injection 0.2 mg  0.2 mg IntraVENous Multiple    simethicone (MYLICON) 27VX/2.4FT oral drops 80 mg  1.2 mL Oral Multiple    atropine injection 0.5 mg  0.5 mg IntraVENous ONCE PRN    EPINEPHrine (ADRENALIN) 0.1 mg/mL syringe 1 mg  1 mg Endoscopically ONCE PRN    glucagon (GLUCAGEN) injection 1 mg  1 mg IntraVENous ONCE    iopamidol (ISOVUE 300) 61 % contrast injection 50 mL  50 mL Other RAD ONCE         PHYSICAL EXAM:  General: WD, WN. Alert, cooperative, no acute distress    HEENT: NC, Atraumatic. PERRLA, EOMI. Anicteric sclerae. Lungs:  CTA Bilaterally. No Wheezing/Rhonchi/Rales. Heart:  Regular  rhythm,  No murmur, No Rubs, No Gallops  Abdomen: Soft, Non distended, Non tender.  +Bowel sounds, no HSM  Extremities: No c/c/e  Neurologic:  CN 2-12 gi, Alert and oriented X 3. No acute neurological distress   Psych:   Good insight. Not anxious nor agitated. The heart, lungs and mental status were satisfactory for the administration of MAC sedation and for the procedure.       Mallampati score: 2       Assessment:   · Bile duct stricture    Plan:   · Endoscopic procedure  · MAC sedation   ·

## 2018-11-21 NOTE — PROGRESS NOTES

## 2018-11-21 NOTE — ROUTINE PROCESS
Alee Johansen Grewal  1965  [id number]    Situation:  Verbal report received from: [employer] [place]  Procedure: Procedure(s):  ENDOSCOPIC RETROGRADE CHOLANGIOPANCREATOGRAPHY (ERCP)  ENDOSCOPIC STONE EXTRACTION/BALLOON SWEEP  ENDOSCOPY WITH PROSTHESIS OR STENT REMOVAL    Background:    Preoperative diagnosis: STRICTURE OF BILE DUCT  Postoperative diagnosis: 1.- Stent Removal  2.- Bile Duct Stricture Resolved    :  Dr. Chad Kaur  Assistant(s): Endoscopy Technician-1: Rachel ROSEN  Endoscopy RN-1: Prema Ku RN    Specimens: * No specimens in log *  H. Pylori  no    Assessment:  Intra-procedure medications     Anesthesia gave intra-procedure sedation and medications, see anesthesia flow sheet yes    Intravenous fluids: NS@ KVO     Vital signs stable     Abdominal assessment: round and soft     Recommendation:  Discharge patient per MD order.     Family or Friend   Permission to share finding with family or friend yes

## 2018-11-21 NOTE — ANESTHESIA POSTPROCEDURE EVALUATION
Procedure(s):  ENDOSCOPIC RETROGRADE CHOLANGIOPANCREATOGRAPHY (ERCP)  ENDOSCOPIC STONE EXTRACTION/BALLOON SWEEP  ENDOSCOPY WITH PROSTHESIS OR STENT REMOVAL. <BSHSIANPOST>    Visit Vitals  /77   Pulse 74   Temp 36.7 °C (98 °F)   Resp 16   Ht 5' 7\" (1.702 m)   Wt 77.1 kg (170 lb)   SpO2 95%   Breastfeeding?  No   BMI 26.63 kg/m²

## 2018-11-21 NOTE — PROCEDURES
[address] [facility].  [address] [facility] [address] [place], [address]   [phone]                   ERCP NOTE    NAME:  Juan Antonio Galan   :   1965   MRN:   [id number]     Date/Time:  2018 11:47 AM    Procedure Type:   ERCPwith biliary sludge removal, biliary stent removal     Indications: biliary obstruction     Pre-operative Diagnosis: see indication above  Post-operative Diagnosis:  See findings below  : Kathy Hilliard MD    Referring Provider:    Nicolas López MD    Sedation:  MAC anesthesia    Procedure Details:  After informed consent was obtained with all risks and benefits of procedure explained, the patient was taken to the fluoroscopy suite and placed in the prone position. Upon sequential sedation as per above, the Olympus duodenoscope [id number]   was inserted via the mouthpeice and carefully advanced to the second portion of the duodenum. The quality of visualization was good. The duodenoscope was withdrawn into a short position. Findings:   Esophagus: not examined in detail  Stomach: not examined in detail  Duodenum/jejunum: not examined in detail  Ampulla:previous sphincterotomy  protruding stent  Cholangiogram: One fully covered biliary metallic stent was removed using a rat toothed forceps. Bile duct was then cannulated using extraction balloon 9-12 mm injection balloon. Balloon was advanced to the biliary confluence. Contrast was injected. Few irregular filing defects were seen in the distal common bile duct. Stricture appeared to have resolved. Balloon sweeps yielded copious amount of sludge. Contrast was flowing freely and no residual stricture was noted. Pancreatogram:not performed    Specimen Removed: * No specimens in log *    Complications: None. EBL:  None. Interventions:    Pancreatic: none  Biliary: One fully covered biliary metallic stent was removed using a rat toothed forceps.  Bile duct was then cannulated using extraction balloon 9-12 mm injection balloon. Balloon was advanced to the biliary confluence. Contrast was injected. Few irregular filing defects were seen in the distal common bile duct. Stricture appeared to have resolved. Balloon sweeps yielded copious amount of sludge. Contrast was flowing freely and no residual stricture was noted. Impression:    -Biliary stricture-resolved  -Biliary sludge-extracted    Recommendations:    -Clear liquid diet and advance as tolerated.  -Low fat diet.    -Resume normal medication(s).     -Hepatic panel in 3 months  -Follow up prn      Discharge Disposition:  Home following recovery in Endoscopy    Sarah Warren MD  11/21/2018  11:47 AM

## 2018-11-21 NOTE — DISCHARGE INSTRUCTIONS
[address] [facility].  [address]  [place]  [id number]  1965    DISCOMFORT:  Sore throat- warm salt water gargle  redness at IV site- apply warm compress to area; if redness or soreness persist- contact your physician  Gaseous discomfort- walking, belching will help relieve any discomfort  You may not operate a vehicle for 12 hours  You may not engage in an occupation involving machinery or appliances for rest of today  You may not drink alcoholic beverages for at least 12 hours  Avoid making any critical decisions for at least 24 hour  DIET  You may eat and drink after you leave. You may resume your regular diet - however -  remember your colon is empty and a heavy meal will produce gas. Avoid these foods:  vegetables, fried / greasy foods, carbonated drinks    ACTIVITY  You may resume your normal daily activities   Spend the remainder of the day resting -  avoid any strenuous activity. CALL M.D. ANY SIGN OF   Increasing pain, nausea, vomiting  Abdominal distension (swelling)  New increased bleeding (oral or rectal)  Fever (chills)  Pain in chest area    Shortness of breath    Follow-up Instructions:   Call Dr. Couch Staff for any questions or problems. . Telephone # [phone]        Continue same medications.

## 2019-05-03 ENCOUNTER — APPOINTMENT (OUTPATIENT)
Dept: GENERAL RADIOLOGY | Age: 54
End: 2019-05-03
Attending: EMERGENCY MEDICINE
Payer: MEDICAID

## 2019-05-03 ENCOUNTER — HOSPITAL ENCOUNTER (EMERGENCY)
Age: 54
Discharge: HOME | End: 2019-05-03
Attending: EMERGENCY MEDICINE
Payer: MEDICAID

## 2019-05-03 ENCOUNTER — APPOINTMENT (OUTPATIENT)
Dept: CT IMAGING | Age: 54
End: 2019-05-03
Attending: EMERGENCY MEDICINE
Payer: MEDICAID

## 2019-05-03 VITALS
HEIGHT: 67 IN | RESPIRATION RATE: 18 BRPM | SYSTOLIC BLOOD PRESSURE: 121 MMHG | DIASTOLIC BLOOD PRESSURE: 72 MMHG | OXYGEN SATURATION: 94 % | HEART RATE: 72 BPM | WEIGHT: 170 LBS | BODY MASS INDEX: 26.68 KG/M2 | TEMPERATURE: 98.2 F

## 2019-05-03 DIAGNOSIS — R42 LIGHT HEADEDNESS: Primary | ICD-10-CM

## 2019-05-03 LAB
ALBUMIN SERPL-MCNC: 3.3 G/DL (ref 3.5–5)
ALBUMIN/GLOB SERPL: 0.8 {RATIO} (ref 1.1–2.2)
ALP SERPL-CCNC: 92 U/L (ref 45–117)
ALT SERPL-CCNC: 36 U/L (ref 12–78)
ANION GAP SERPL CALC-SCNC: 1 MMOL/L (ref 5–15)
AST SERPL-CCNC: 31 U/L (ref 15–37)
ATRIAL RATE: 82 BPM
BASOPHILS # BLD: 0 K/UL (ref 0–0.1)
BASOPHILS NFR BLD: 1 % (ref 0–1)
BILIRUB SERPL-MCNC: 0.2 MG/DL (ref 0.2–1)
BUN SERPL-MCNC: 12 MG/DL (ref 6–20)
BUN/CREAT SERPL: 23 (ref 12–20)
CALCIUM SERPL-MCNC: 9.1 MG/DL (ref 8.5–10.1)
CALCULATED P AXIS, ECG09: 75 DEGREES
CALCULATED R AXIS, ECG10: 26 DEGREES
CALCULATED T AXIS, ECG11: 33 DEGREES
CHLORIDE SERPL-SCNC: 107 MMOL/L (ref 97–108)
CO2 SERPL-SCNC: 32 MMOL/L (ref 21–32)
COMMENT, HOLDF: NORMAL
CREAT SERPL-MCNC: 0.52 MG/DL (ref 0.55–1.02)
D DIMER PPP FEU-MCNC: 1.49 MG/L FEU (ref 0–0.65)
DIAGNOSIS, 93000: NORMAL
DIFFERENTIAL METHOD BLD: NORMAL
EOSINOPHIL # BLD: 0.3 K/UL (ref 0–0.4)
EOSINOPHIL NFR BLD: 4 % (ref 0–7)
ERYTHROCYTE [DISTWIDTH] IN BLOOD BY AUTOMATED COUNT: 13.4 % (ref 11.5–14.5)
GLOBULIN SER CALC-MCNC: 4.3 G/DL (ref 2–4)
GLUCOSE SERPL-MCNC: 115 MG/DL (ref 65–100)
HCT VFR BLD AUTO: 37.2 % (ref 35–47)
HGB BLD-MCNC: 11.5 G/DL (ref 11.5–16)
IMM GRANULOCYTES # BLD AUTO: 0 K/UL (ref 0–0.04)
IMM GRANULOCYTES NFR BLD AUTO: 0 % (ref 0–0.5)
LYMPHOCYTES # BLD: 1.3 K/UL (ref 0.8–3.5)
LYMPHOCYTES NFR BLD: 19 % (ref 12–49)
MCH RBC QN AUTO: 27.5 PG (ref 26–34)
MCHC RBC AUTO-ENTMCNC: 30.9 G/DL (ref 30–36.5)
MCV RBC AUTO: 89 FL (ref 80–99)
MONOCYTES # BLD: 0.8 K/UL (ref 0–1)
MONOCYTES NFR BLD: 12 % (ref 5–13)
NEUTS SEG # BLD: 4.4 K/UL (ref 1.8–8)
NEUTS SEG NFR BLD: 64 % (ref 32–75)
NRBC # BLD: 0 K/UL (ref 0–0.01)
NRBC BLD-RTO: 0 PER 100 WBC
P-R INTERVAL, ECG05: 126 MS
PLATELET # BLD AUTO: 219 K/UL (ref 150–400)
PMV BLD AUTO: 8.9 FL (ref 8.9–12.9)
POTASSIUM SERPL-SCNC: 3.2 MMOL/L (ref 3.5–5.1)
PROT SERPL-MCNC: 7.6 G/DL (ref 6.4–8.2)
Q-T INTERVAL, ECG07: 360 MS
QRS DURATION, ECG06: 84 MS
QTC CALCULATION (BEZET), ECG08: 420 MS
RBC # BLD AUTO: 4.18 M/UL (ref 3.8–5.2)
SAMPLES BEING HELD,HOLD: NORMAL
SODIUM SERPL-SCNC: 140 MMOL/L (ref 136–145)
TROPONIN I SERPL-MCNC: <0.05 NG/ML
VENTRICULAR RATE, ECG03: 82 BPM
WBC # BLD AUTO: 6.8 K/UL (ref 3.6–11)

## 2019-05-03 PROCEDURE — 80053 COMPREHEN METABOLIC PANEL: CPT

## 2019-05-03 PROCEDURE — 93005 ELECTROCARDIOGRAM TRACING: CPT

## 2019-05-03 PROCEDURE — 85379 FIBRIN DEGRADATION QUANT: CPT

## 2019-05-03 PROCEDURE — 85025 COMPLETE CBC W/AUTO DIFF WBC: CPT

## 2019-05-03 PROCEDURE — 71046 X-RAY EXAM CHEST 2 VIEWS: CPT

## 2019-05-03 PROCEDURE — 84484 ASSAY OF TROPONIN QUANT: CPT

## 2019-05-03 PROCEDURE — 36415 COLL VENOUS BLD VENIPUNCTURE: CPT

## 2019-05-03 PROCEDURE — 71275 CT ANGIOGRAPHY CHEST: CPT

## 2019-05-03 PROCEDURE — 99283 EMERGENCY DEPT VISIT LOW MDM: CPT

## 2019-05-03 PROCEDURE — 74011636320 HC RX REV CODE- 636/320: Performed by: RADIOLOGY

## 2019-05-03 RX ADMIN — IOPAMIDOL 80 ML: 755 INJECTION, SOLUTION INTRAVENOUS at 14:45

## 2019-05-03 NOTE — ED PROVIDER NOTES
47 y.o. female with past medical history significant for MS, hypothyroidism, HTN, stroke, lupus, DDD, Ehler Danlos syndrome, anti-phospholipid syndrome, Raynaud disease, and nephrolithiasis who presents from PCP office with chief complaint of chest pain. Pt has been experiencing intermittent left sided chest pain for the past 5 days with intermittent radiation into her left arm and neck. She saw evaluated at her PCP office 4 days ago and was diagnosed with pneumonia. She then f/u today where she had low O2 sats. Her PCP called her in prescriptions for more Abx and steroids and told her to come into the ED if her sx did not improve. Once she left, she began experiencing lightheadedness and dizziness prompting her to come into the ED. She reports a persistent cough and intermittent fevers. She denies any changes in her chest pain. Pt has been compliant with her Abx and has one dose left of her Erythromycin today. There are no other acute medical concerns at this time. Social hx: no tobacco use; no EtOH use  PCP: Charlie Elliott MD    Note written by Katia Solano, as dictated by Quita Rachel MD 12:30 PM      The history is provided by the patient. No  was used. Past Medical History:   Diagnosis Date    Anti-phospholipid syndrome ([address] 2005    DDD (degenerative disc disease) age 24    Ehler Danlos syndrome     joint laxity    H/O: stroke 2005    History of multiple strokes, PFO-had surgery, star flex in heart to seal hole.     Hypertension     Hypothyroidism     takes Synthoid    Infertility female     multiple miscarriages    Lupus 2005    MS (multiple sclerosis) ([address] 2005     neurologist-Dr. Amol Bucio, fatigue, no focal deficits    Nausea & vomiting     Nephrolithiasis     Raynaud disease     Dr Pam Hanley ( Rheum)     Stroke [facility])     last stroke was in 2005    Unspecified adverse effect of anesthesia     mother, sister and daughter difficult to arouse       Past Surgical History:   Procedure Laterality Date    BIOPSY BREAST      right breast, twice, benign    HX ANKLE FRACTURE TX      Both ankles (1983, 1984) & (1996, 1997)   [address]  December 1997    HX CERVICAL FUSION  09/2011    HX CHOLECYSTECTOMY  1994    HX DILATION AND CURETTAGE      multiple    HX GYN      uterine ablation    HX LITHOTRIPSY      multiple    HX OTHER SURGICAL      bilateral axillary glands removed    HX SHOULDER ARTHROSCOPY      Right Shoulder Surgery twice (1996, 2006)   [address]    HX UROLOGICAL  11/2018    bladder and uterine repair    [address] WOUND  2005    History of multiple strokes, PFO-had surgery, star flex in heart to seal hole.          Family History:   Problem Relation Age of Onset    Cancer Mother         emphysema    Cancer Father        Social History     Socioeconomic History    Marital status:      Spouse name: Not on file    Number of children: Not on file    Years of education: Not on file    Highest education level: Not on file   Occupational History    Not on file   Social Needs    Financial resource strain: Not on file    Food insecurity:     Worry: Not on file     Inability: Not on file    Transportation needs:     Medical: Not on file     Non-medical: Not on file   Tobacco Use    Smoking status: Never Smoker    Smokeless tobacco: Never Used   Substance and Sexual Activity    Alcohol use: No     Comment:      Drug use: No    Sexual activity: Not Currently   Lifestyle    Physical activity:     Days per week: Not on file     Minutes per session: Not on file    Stress: Not on file   Relationships    Social connections:     Talks on phone: Not on file     Gets together: Not on file     Attends [employer] service: Not on file     Active member of club or organization: Not on file     Attends meetings of clubs or organizations: Not on file     Relationship status: Not on file    Intimate partner violence:     Fear of current or ex partner: Not on file     Emotionally abused: Not on file     Physically abused: Not on file     Forced sexual activity: Not on file   Other Topics Concern    Not on file   Social History Narrative    Not on file         ALLERGIES: Codeine and Imitrex [sumatriptan succinate]    Review of Systems   Constitutional: Negative for activity change, chills and fever. HENT: Negative for nosebleeds, sore throat, trouble swallowing and voice change. Eyes: Negative for visual disturbance. Respiratory: Negative for shortness of breath. Cardiovascular: Positive for chest pain. Negative for palpitations. Gastrointestinal: Negative for abdominal pain, constipation, diarrhea and nausea. Genitourinary: Negative for difficulty urinating, dysuria, hematuria and urgency. Musculoskeletal: Negative for back pain, neck pain and neck stiffness. Skin: Negative for color change. Allergic/Immunologic: Negative for immunocompromised state. Neurological: Positive for dizziness and light-headedness. Negative for seizures, syncope, weakness, numbness and headaches. Psychiatric/Behavioral: Negative for behavioral problems, confusion, hallucinations, self-injury and suicidal ideas. All other systems reviewed and are negative. Vitals:    05/03/19 1233   BP: (!) 185/95   Pulse: 71   Resp: 18   Temp: 98.2 °F (36.8 °C)   SpO2: 100%   Weight: 77.1 kg (170 lb)   Height: 5' 7\" (1.702 m)            Physical Exam   Constitutional: She is oriented to person, place, and time. She appears well-developed and well-nourished. No distress. HENT:   Head: Normocephalic and atraumatic. Eyes: Pupils are equal, round, and reactive to light. Neck: Normal range of motion. Neck supple. Cardiovascular: Normal rate, regular rhythm and normal heart sounds. Exam reveals no gallop and no friction rub. No murmur heard. Pulmonary/Chest: Effort normal and breath sounds normal. No respiratory distress. She has no wheezes. Tenderness to palpation of the left chest.    Abdominal: Soft. Bowel sounds are normal. She exhibits no distension. There is no tenderness. There is no rebound and no guarding. Musculoskeletal: Normal range of motion. Neurological: She is alert and oriented to person, place, and time. Skin: Skin is warm. No rash noted. She is not diaphoretic. Psychiatric: She has a normal mood and affect. Her behavior is normal. Judgment and thought content normal.   Nursing note and vitals reviewed. Note written by Katia Fitzpatrick, as dictated by Nicolasa Byrnes MD 12:30 PM     MDM     This is a 80-year-old female with past medical history, review of systems, physical exam as above, presenting with complaints of dizziness, lightheadedness, and hypoxia. She recently evaluated and treated by her primary care physician for pneumonia, so her in the office today, for followup, or she was noted to have room air saturations in the 80s. Per patient, are she refused her physician's advice to present to the emergency department, however began to feel dizzy and lightheaded while driving home, prompting her presentation. She states she is continued to have left-sided chest pain, denies cough, fevers, nausea or vomiting. Physical exam is remarkable for well-appearing middle-age female, in no acute distress, with left chest wall tenderness to palpation, clear breath sounds, soft abdomen, regular rate and rhythm without murmurs gallops or rubs. She is noted to be satting 100% on room air, fever, or tachycardia. Differential includes electrolyte abnormality, acute coronary syndrome, pneumonia. Plan to obtain CMP, CBC, EKG, chest x-ray, cardiac enzymes, d-dimer. We will reevaluate, and make a disposition based the patient's diagnostics and response to therapy. Procedures      ED EKG interpretation:  Rhythm: normal sinus rhythm; and regular .  Rate (approx.): 82;  ST/T wave: T wave inversions in V2 and V3, no ST changes. Note written by Katia Veliz, as dictated by Eloina Layne MD 12:52 PM    PROGRESS NOTE:  1:31 PM  Ordered CTA of the chest for elevated dimer, chest pain, and dizziness. PROGRESS NOTE:  3:52 PM  No evidence of PE on CT.     3:51 PM  Patient's results have been reviewed with them. Patient and/or family have verbally conveyed their understanding and agreement of the patient's signs, symptoms, diagnosis, treatment and prognosis and additionally agree to follow up as recommended or return to the Emergency Room should their condition change prior to follow-up. Discharge instructions have also been provided to the patient with some educational information regarding their diagnosis as well a list of reasons why they would want to return to the ER prior to their follow-up appointment should their condition change.

## 2019-05-03 NOTE — ED NOTES
Pt was discharged and given instructions by Dr Narciso Haney . Pt verbalized good understanding of all discharge instructions, and F/U care. All questions answered. Pt in stable condition on discharge.

## 2019-05-03 NOTE — ED TRIAGE NOTES
Chest pain on/off for 4 days, radiation at times to left arm and up left neck, Patient states her pulse ox was \" low\" at this the doctors office, patient was not going to come to the ED but go lightheaded while driving the car. Dx with Pneumonia 4 days ago. Took Z-georgiana and has 1 more day of Emycin.

## 2019-05-03 NOTE — DISCHARGE INSTRUCTIONS

## 2019-06-26 ENCOUNTER — HOSPITAL ENCOUNTER (OUTPATIENT)
Dept: NUCLEAR MEDICINE | Age: 54
Discharge: HOME | End: 2019-06-26
Attending: INTERNAL MEDICINE
Payer: MEDICAID

## 2019-06-26 DIAGNOSIS — R93.2 ABNORMAL FINDINGS ON IMAGING OF BILIARY TRACT: ICD-10-CM

## 2019-06-26 DIAGNOSIS — R10.11 RUQ PAIN: ICD-10-CM

## 2019-06-26 DIAGNOSIS — K83.1 STRICTURE OF BILE DUCT: ICD-10-CM

## 2019-06-26 DIAGNOSIS — R74.8 ABNORMAL LIVER ENZYMES: ICD-10-CM

## 2019-06-26 PROCEDURE — 78264 GASTRIC EMPTYING IMG STUDY: CPT

## 2019-10-07 ENCOUNTER — ANESTHESIA EVENT (OUTPATIENT)
Dept: ENDOSCOPY | Age: 54
End: 2019-10-07
Payer: MEDICAID

## 2019-10-07 ENCOUNTER — HOSPITAL ENCOUNTER (OUTPATIENT)
Age: 54
Setting detail: OUTPATIENT SURGERY
Discharge: HOME | End: 2019-10-07
Attending: INTERNAL MEDICINE | Admitting: INTERNAL MEDICINE
Payer: MEDICAID

## 2019-10-07 ENCOUNTER — ANESTHESIA (OUTPATIENT)
Dept: ENDOSCOPY | Age: 54
End: 2019-10-07
Payer: MEDICAID

## 2019-10-07 VITALS
BODY MASS INDEX: 26.68 KG/M2 | HEIGHT: 67 IN | SYSTOLIC BLOOD PRESSURE: 146 MMHG | WEIGHT: 170 LBS | RESPIRATION RATE: 20 BRPM | TEMPERATURE: 98 F | OXYGEN SATURATION: 99 % | HEART RATE: 69 BPM | DIASTOLIC BLOOD PRESSURE: 87 MMHG

## 2019-10-07 PROCEDURE — 76040000019: Performed by: INTERNAL MEDICINE

## 2019-10-07 PROCEDURE — 74011000250 HC RX REV CODE- 250: Performed by: NURSE ANESTHETIST, CERTIFIED REGISTERED

## 2019-10-07 PROCEDURE — 74011250636 HC RX REV CODE- 250/636: Performed by: NURSE ANESTHETIST, CERTIFIED REGISTERED

## 2019-10-07 PROCEDURE — 88305 TISSUE EXAM BY PATHOLOGIST: CPT

## 2019-10-07 PROCEDURE — 77030021593 HC FCPS BIOP ENDOSC BSC -A: Performed by: INTERNAL MEDICINE

## 2019-10-07 PROCEDURE — 76060000031 HC ANESTHESIA FIRST 0.5 HR: Performed by: INTERNAL MEDICINE

## 2019-10-07 RX ORDER — ATROPINE SULFATE 0.1 MG/ML
0.5 INJECTION INTRAVENOUS
Status: DISCONTINUED | OUTPATIENT
Start: 2019-10-07 | End: 2019-10-07 | Stop reason: HOSPADM

## 2019-10-07 RX ORDER — BUPRENORPHINE HYDROCHLORIDE 8 MG/1
TABLET SUBLINGUAL DAILY
COMMUNITY

## 2019-10-07 RX ORDER — NALOXONE HYDROCHLORIDE 0.4 MG/ML
0.4 INJECTION, SOLUTION INTRAMUSCULAR; INTRAVENOUS; SUBCUTANEOUS
Status: DISCONTINUED | OUTPATIENT
Start: 2019-10-07 | End: 2019-10-07 | Stop reason: HOSPADM

## 2019-10-07 RX ORDER — FLUMAZENIL 0.1 MG/ML
0.2 INJECTION INTRAVENOUS
Status: DISCONTINUED | OUTPATIENT
Start: 2019-10-07 | End: 2019-10-07 | Stop reason: HOSPADM

## 2019-10-07 RX ORDER — SODIUM CHLORIDE 9 MG/ML
INJECTION, SOLUTION INTRAVENOUS
Status: DISCONTINUED | OUTPATIENT
Start: 2019-10-07 | End: 2019-10-07 | Stop reason: HOSPADM

## 2019-10-07 RX ORDER — SODIUM CHLORIDE 0.9 % (FLUSH) 0.9 %
5-40 SYRINGE (ML) INJECTION AS NEEDED
Status: DISCONTINUED | OUTPATIENT
Start: 2019-10-07 | End: 2019-10-07 | Stop reason: HOSPADM

## 2019-10-07 RX ORDER — PROPOFOL 10 MG/ML
INJECTION, EMULSION INTRAVENOUS AS NEEDED
Status: DISCONTINUED | OUTPATIENT
Start: 2019-10-07 | End: 2019-10-07 | Stop reason: HOSPADM

## 2019-10-07 RX ORDER — SODIUM CHLORIDE 0.9 % (FLUSH) 0.9 %
5-40 SYRINGE (ML) INJECTION EVERY 8 HOURS
Status: DISCONTINUED | OUTPATIENT
Start: 2019-10-07 | End: 2019-10-07 | Stop reason: HOSPADM

## 2019-10-07 RX ORDER — DEXTROMETHORPHAN/PSEUDOEPHED 2.5-7.5/.8
1.2 DROPS ORAL
Status: DISCONTINUED | OUTPATIENT
Start: 2019-10-07 | End: 2019-10-07 | Stop reason: HOSPADM

## 2019-10-07 RX ORDER — SODIUM CHLORIDE 9 MG/ML
50 INJECTION, SOLUTION INTRAVENOUS CONTINUOUS
Status: DISCONTINUED | OUTPATIENT
Start: 2019-10-07 | End: 2019-10-07 | Stop reason: HOSPADM

## 2019-10-07 RX ORDER — EPINEPHRINE 0.1 MG/ML
1 INJECTION INTRACARDIAC; INTRAVENOUS
Status: DISCONTINUED | OUTPATIENT
Start: 2019-10-07 | End: 2019-10-07 | Stop reason: HOSPADM

## 2019-10-07 RX ORDER — ERYTHROMYCIN 250 MG/1
250 TABLET, COATED ORAL 4 TIMES DAILY
COMMUNITY

## 2019-10-07 RX ORDER — LIDOCAINE HYDROCHLORIDE 20 MG/ML
INJECTION, SOLUTION EPIDURAL; INFILTRATION; INTRACAUDAL; PERINEURAL AS NEEDED
Status: DISCONTINUED | OUTPATIENT
Start: 2019-10-07 | End: 2019-10-07 | Stop reason: HOSPADM

## 2019-10-07 RX ORDER — SERTRALINE HYDROCHLORIDE 50 MG/1
50 TABLET, FILM COATED ORAL DAILY
COMMUNITY

## 2019-10-07 RX ADMIN — PROPOFOL 100 MG: 10 INJECTION, EMULSION INTRAVENOUS at 13:55

## 2019-10-07 RX ADMIN — SODIUM CHLORIDE: 900 INJECTION, SOLUTION INTRAVENOUS at 13:50

## 2019-10-07 RX ADMIN — PROPOFOL 50 MG: 10 INJECTION, EMULSION INTRAVENOUS at 14:01

## 2019-10-07 RX ADMIN — PROPOFOL 50 MG: 10 INJECTION, EMULSION INTRAVENOUS at 13:58

## 2019-10-07 RX ADMIN — LIDOCAINE HYDROCHLORIDE 40 MG: 20 INJECTION, SOLUTION EPIDURAL; INFILTRATION; INTRACAUDAL; PERINEURAL at 13:55

## 2019-10-07 NOTE — PROGRESS NOTES
Received report from Anesthesia-see anesthesia record for vital signs and medications administered during procedure. Resumed care for vital signs and medications.

## 2019-10-07 NOTE — PROCEDURES
[address] [facility].  [address] [facility] [address], [address]  [phone]                           Colonoscopy and EGD Procedure Note      Indications:    Change in bowel habits, Diarrhea, epigastric pain     :  Jericho Monroy MD    Staff: Endoscopy Julio Hagen: Laura Lincoln  Endoscopy RN-1: Pineda Franklin RN  Endoscopy RN-2: Marco Monte RN     Implants: none    Referring Provider: Nahomi Lewis MD    Sedation:  MAC anesthesia    Procedure Details:  After informed consent was obtained with all risks and benefits of procedure explained and preoperative exam completed, the patient was taken to the endoscopy suite and placed in the left lateral decubitus position. Upon sequential sedation as per above, a digital rectal exam was performed  And was normal.  The Olympus videocolonoscope  was inserted in the rectum and carefully advanced to the sigmoid colon. The quality of preparation was inadequate. The colonoscope was slowly withdrawn with careful evaluation between folds. Retroflexion in the rectum was performed and was normal..     Colon Findings:   Rectum: no mucosal lesion appreciated  Solid stool present;;  Sigmoid: no mucosal lesion appreciated  Solid stool present;;  Descending Colon: not intubated  Transverse Colon: not intubated  Ascending Colon: not intubated  Cecum: not intubated  Terminal Ileum: not intubated      Following sequential administration of sedation as per above, the [id number] gastroscope was inserted into the mouth and advanced under direct vision to second portion of the duodenum. A careful inspection was made as the gastroscope was withdrawn, including a retroflexed view of the proximal stomach; findings and interventions are described below. EGD Findings:  Esophagus: Normal mucosa. Sliding hiatal hernia was noted from 35 cm to 39 cm.    Stomach:mild erythema was noted in  body, antrum  Duodenum/jejunum:normal      Therapies:  biopsy of stomach body, antrum  biopsy of duodenal second portion    Complications:   None; patient tolerated the procedure well. Impression:    See Postoperative diagnosis above    Recommendations:  -Continue acid suppression. , -Await pathology. , -Follow symptoms. , -Follow up with me., -Colonoscopy with double prep next available    Interventions:  biopsy of stomach body, antrum  biopsy of duodenal second portion      Complications: None. Specimen Removed:  * No specimens in log *    EBL:  None. Discharge Disposition:  Home in the company of a  when able to ambulate.     Romaine Cantrell MD  10/7/2019  2:09 PM

## 2019-10-07 NOTE — ANESTHESIA PREPROCEDURE EVALUATION
Relevant Problems   No relevant active problems       Anesthetic History   No history of anesthetic complications  PONV          Review of Systems / Medical History  Patient summary reviewed, nursing notes reviewed and pertinent labs reviewed    Pulmonary  Within defined limits                 Neuro/Psych   Within defined limits    CVA       Cardiovascular  Within defined limits  Hypertension                   GI/Hepatic/Renal  Within defined limits              Endo/Other  Within defined limits    Hypothyroidism  Arthritis     Other Findings   Comments: Lupus  Ehler-Danlos           Physical Exam    Airway  Mallampati: II  TM Distance: > 6 cm  Neck ROM: normal range of motion   Mouth opening: Normal     Cardiovascular  Regular rate and rhythm,  S1 and S2 normal,  no murmur, click, rub, or gallop             Dental    Dentition: Poor dentition     Pulmonary  Breath sounds clear to auscultation               Abdominal  GI exam deferred       Other Findings            Anesthetic Plan    ASA: 3  Anesthesia type: MAC            Anesthetic plan and risks discussed with: Patient

## 2019-10-07 NOTE — PROGRESS NOTES
Good Armas Grewal  1965  [id number]    Situation:  Verbal report received from: radha luong rn  Procedure: Procedure(s):  ESOPHAGOGASTRODUODENOSCOPY (EGD)  COLONOSCOPY  ESOPHAGOGASTRODUODENAL (EGD) BIOPSY    Background:    Preoperative diagnosis: Anemia  Postoperative diagnosis: hiatal hernia  gastritis  inadequate bowel prep      :  Dr. Pat Jha  Assistant(s): Endoscopy Technician-1: Thomas Huerta  Endoscopy RN-1: Jelena Nazario RN  Endoscopy RN-2: Ruthie Garcia RN    Specimens: * No specimens in log *  H. Pylori  no    Assessment:  Intra-procedure medications     Anesthesia gave intra-procedure sedation and medications, see anesthesia flow sheet yes    Intravenous fluids: NS@ KVO     Vital signs stable  yes    Abdominal assessment: round and soft  yes    Recommendation:  Discharge patient per MD order yes.   Family or Friend  yes  Permission to share finding with family or friend yes

## 2019-10-07 NOTE — ANESTHESIA POSTPROCEDURE EVALUATION
Procedure(s):  ESOPHAGOGASTRODUODENOSCOPY (EGD)  COLONOSCOPY  ESOPHAGOGASTRODUODENAL (EGD) BIOPSY. MAC    Anesthesia Post Evaluation      Multimodal analgesia: multimodal analgesia not used between 6 hours prior to anesthesia start to PACU discharge  Patient location during evaluation: PACU  Patient participation: complete - patient participated  Level of consciousness: awake  Pain score: 0  Pain management: adequate  Airway patency: patent  Anesthetic complications: no  Cardiovascular status: acceptable  Respiratory status: acceptable  Hydration status: acceptable  Comments: I have evaluated the patient and meets criteria for discharge from PACU. Stephanie Brady MD        Vitals Value Taken Time   /84 10/7/2019  2:18 PM   Temp     Pulse 65 10/7/2019  2:14 PM   Resp 0 10/7/2019  2:15 PM   SpO2 98 % 10/7/2019  2:14 PM   Vitals shown include unvalidated device data.

## 2019-10-07 NOTE — PERIOP NOTES

## 2019-10-07 NOTE — DISCHARGE INSTRUCTIONS
[address]  [address]  [id number]  1965    DISCOMFORT:  Redness at IV site- apply warm compress to area; if redness or soreness persist- contact your physician  There may be a slight amount of blood passed from the rectum  Gaseous discomfort- walking, belching will help relieve any discomfort    DIET:   High fiber diet. - however -  remember your colon is empty and a heavy meal will produce gas. Avoid these foods:  vegetables, fried / greasy foods, carbonated drinks for today      ACTIVITY  Spend the remainder of the day resting -  avoid any strenuous activity, then you may resume your normal daily activities   You may not operate a vehicle for 12 hours  You may not  engage in an occupation involving machinery or appliances for rest of today  You may not  drink alcoholic beverages for at least 12 hours  Avoid making any critical decisions for at least 24 hour    CALL M.D. ANY SIGN OF   Increasing pain, nausea, vomiting  Abdominal distension (swelling)  New increased bleeding (oral or rectal)  Fever (chills)  Pain in chest area  Bloody discharge from nose or mouth  Shortness of breath    You may not  take any Advil, Aspirin, Ibuprofen, Motrin, Aleve, or Goodys for 3 days, ONLY  Tylenol as needed for pain. Post procedure diagnosis: hiatal hernia  gastritis  inadequate bowel prep        Follow-up Instructions:   Call Dr. Shyam Freeman for any questions or problems. If we took a biopsy please call the office within 2 weeks to discuss your pathology results.  Telephone # [phone]

## 2019-10-07 NOTE — H&P
[address] [facility] [address]  [address] [facility] [address]  [place], [address]  [phone]                                History and Physical     NAME: Evi Sears   :  1965   MRN:  [id number]     HPI:  The patient was seen and examined. Past Surgical History:   Procedure Laterality Date    BIOPSY BREAST      right breast, twice, benign    HX ANKLE FRACTURE TX      Both ankles (, ) & (, )   [address]  1997    HX CERVICAL FUSION  2011    HX CHOLECYSTECTOMY      HX DILATION AND CURETTAGE      multiple    HX GYN      uterine ablation    HX LITHOTRIPSY      multiple    HX OTHER SURGICAL      bilateral axillary glands removed    HX SHOULDER ARTHROSCOPY      Right Shoulder Surgery twice (, )   [address] [place] [address]    HX UROLOGICAL  2018    bladder and uterine repair    [address] [facility] WOUND      History of multiple strokes, PFO-had surgery, star flex in heart to seal hole. Past Medical History:   Diagnosis Date    Anti-phospholipid syndrome ([address]     DDD (degenerative disc disease) age 24    Ehler Danlos syndrome     joint laxity    H/O: stroke     History of multiple strokes, PFO-had surgery, star flex in heart to seal hole.     Hypertension     Hypothyroidism     takes Synthoid    Infertility female     multiple miscarriages    Lupus ([address]     MS (multiple sclerosis) ([address] 2005     neurologist-Dr. Nishant Sykes, fatigue, no focal deficits    Nausea & vomiting     Nephrolithiasis     Raynaud disease     Dr Kamini Souza ( Rheum)     Stroke [facility])     last stroke was in     Unspecified adverse effect of anesthesia     mother, sister and daughter difficult to arouse     Social History     Tobacco Use    Smoking status: Never Smoker    Smokeless tobacco: Never Used   Substance Use Topics    Alcohol use: No     Comment:      Drug use: No     Allergies   Allergen Reactions    Codeine Other (comments) pain    Imitrex [Sumatriptan Succinate] Other (comments)     Chest pain    Toradol [Ketorolac] Other (comments)     Gallbladder pain     Family History   Problem Relation Age of Onset    Cancer Mother         emphysema    Cancer Father      No current facility-administered medications for this encounter. PHYSICAL EXAM:  General: WD, WN. Alert, cooperative, no acute distress    HEENT: NC, Atraumatic. PERRLA, EOMI. Anicteric sclerae. Lungs:  CTA Bilaterally. No Wheezing/Rhonchi/Rales. Heart:  Regular  rhythm,  No murmur, No Rubs, No Gallops  Abdomen: Soft, Non distended, Non tender.  +Bowel sounds, no HSM  Extremities: No c/c/e  Neurologic:  CN 2-12 gi, Alert and oriented X 3. No acute neurological distress   Psych:   Good insight. Not anxious nor agitated. The heart, lungs and mental status were satisfactory for the administration of MAC sedation and for the procedure.       Mallampati score: 2       Assessment:   · Loss of appetite  · Nausea and vomiting  · Diarrhea  · Lower abdominal pain    Plan:   · Endoscopic procedure  · MAC sedation   ·

## 2022-09-04 ENCOUNTER — APPOINTMENT (OUTPATIENT)
Dept: CT IMAGING | Age: 57
End: 2022-09-04
Attending: EMERGENCY MEDICINE
Payer: MEDICARE

## 2022-09-04 ENCOUNTER — APPOINTMENT (OUTPATIENT)
Dept: GENERAL RADIOLOGY | Age: 57
End: 2022-09-04
Attending: EMERGENCY MEDICINE
Payer: MEDICARE

## 2022-09-04 ENCOUNTER — HOSPITAL ENCOUNTER (EMERGENCY)
Age: 57
Discharge: HOME | End: 2022-09-04
Attending: EMERGENCY MEDICINE
Payer: MEDICARE

## 2022-09-04 VITALS
TEMPERATURE: 97.4 F | DIASTOLIC BLOOD PRESSURE: 88 MMHG | SYSTOLIC BLOOD PRESSURE: 120 MMHG | RESPIRATION RATE: 18 BRPM | HEIGHT: 67 IN | HEART RATE: 58 BPM | OXYGEN SATURATION: 95 % | BODY MASS INDEX: 24.8 KG/M2 | WEIGHT: 158 LBS

## 2022-09-04 DIAGNOSIS — R07.81 RIB PAIN ON RIGHT SIDE: ICD-10-CM

## 2022-09-04 DIAGNOSIS — J18.9 PNEUMONIA OF LEFT LUNG DUE TO INFECTIOUS ORGANISM, UNSPECIFIED PART OF LUNG: Primary | ICD-10-CM

## 2022-09-04 LAB
AMORPH CRY URNS QL MICRO: ABNORMAL
ANION GAP SERPL CALC-SCNC: 6 MMOL/L (ref 5–15)
APPEARANCE UR: ABNORMAL
BACTERIA URNS QL MICRO: ABNORMAL /HPF
BASOPHILS # BLD: 0.1 K/UL (ref 0–0.1)
BASOPHILS NFR BLD: 1 % (ref 0–1)
BILIRUB UR QL: NEGATIVE
BUN SERPL-MCNC: 14 MG/DL (ref 6–20)
BUN/CREAT SERPL: 18 (ref 12–20)
CALCIUM SERPL-MCNC: 8.9 MG/DL (ref 8.5–10.1)
CHLORIDE SERPL-SCNC: 102 MMOL/L (ref 97–108)
CO2 SERPL-SCNC: 31 MMOL/L (ref 21–32)
COLOR UR: ABNORMAL
CREAT SERPL-MCNC: 0.79 MG/DL (ref 0.55–1.02)
D DIMER PPP FEU-MCNC: 4.26 MG/L FEU (ref 0–0.65)
DIFFERENTIAL METHOD BLD: ABNORMAL
EOSINOPHIL # BLD: 0.3 K/UL (ref 0–0.4)
EOSINOPHIL NFR BLD: 3 % (ref 0–7)
EPITH CASTS URNS QL MICRO: ABNORMAL /LPF
ERYTHROCYTE [DISTWIDTH] IN BLOOD BY AUTOMATED COUNT: 14.5 % (ref 11.5–14.5)
GLUCOSE SERPL-MCNC: 104 MG/DL (ref 65–100)
GLUCOSE UR STRIP.AUTO-MCNC: NEGATIVE MG/DL
HCT VFR BLD AUTO: 34.9 % (ref 35–47)
HGB BLD-MCNC: 10.9 G/DL (ref 11.5–16)
HGB UR QL STRIP: NEGATIVE
IMM GRANULOCYTES # BLD AUTO: 0 K/UL (ref 0–0.04)
IMM GRANULOCYTES NFR BLD AUTO: 0 % (ref 0–0.5)
KETONES UR QL STRIP.AUTO: NEGATIVE MG/DL
LEUKOCYTE ESTERASE UR QL STRIP.AUTO: NEGATIVE
LYMPHOCYTES # BLD: 1.3 K/UL (ref 0.8–3.5)
LYMPHOCYTES NFR BLD: 12 % (ref 12–49)
MCH RBC QN AUTO: 27.6 PG (ref 26–34)
MCHC RBC AUTO-ENTMCNC: 31.2 G/DL (ref 30–36.5)
MCV RBC AUTO: 88.4 FL (ref 80–99)
MONOCYTES # BLD: 0.7 K/UL (ref 0–1)
MONOCYTES NFR BLD: 6 % (ref 5–13)
MUCOUS THREADS URNS QL MICRO: ABNORMAL /LPF
NEUTS SEG # BLD: 8.5 K/UL (ref 1.8–8)
NEUTS SEG NFR BLD: 78 % (ref 32–75)
NITRITE UR QL STRIP.AUTO: NEGATIVE
NRBC # BLD: 0 K/UL (ref 0–0.01)
NRBC BLD-RTO: 0 PER 100 WBC
PH UR STRIP: 6 [PH] (ref 5–8)
PLATELET # BLD AUTO: 280 K/UL (ref 150–400)
PMV BLD AUTO: 8.4 FL (ref 8.9–12.9)
POTASSIUM SERPL-SCNC: 3.5 MMOL/L (ref 3.5–5.1)
PROT UR STRIP-MCNC: NEGATIVE MG/DL
RBC # BLD AUTO: 3.95 M/UL (ref 3.8–5.2)
RBC #/AREA URNS HPF: ABNORMAL /HPF (ref 0–5)
SODIUM SERPL-SCNC: 139 MMOL/L (ref 136–145)
SP GR UR REFRACTOMETRY: >1.03 (ref 1–1.03)
UROBILINOGEN UR QL STRIP.AUTO: 0.2 EU/DL (ref 0.2–1)
WBC # BLD AUTO: 10.8 K/UL (ref 3.6–11)
WBC URNS QL MICRO: ABNORMAL /HPF (ref 0–4)

## 2022-09-04 PROCEDURE — 74011250636 HC RX REV CODE- 250/636: Performed by: EMERGENCY MEDICINE

## 2022-09-04 PROCEDURE — 80048 BASIC METABOLIC PNL TOTAL CA: CPT

## 2022-09-04 PROCEDURE — 36415 COLL VENOUS BLD VENIPUNCTURE: CPT

## 2022-09-04 PROCEDURE — 99285 EMERGENCY DEPT VISIT HI MDM: CPT

## 2022-09-04 PROCEDURE — 85379 FIBRIN DEGRADATION QUANT: CPT

## 2022-09-04 PROCEDURE — 71275 CT ANGIOGRAPHY CHEST: CPT

## 2022-09-04 PROCEDURE — 96374 THER/PROPH/DIAG INJ IV PUSH: CPT

## 2022-09-04 PROCEDURE — 81001 URINALYSIS AUTO W/SCOPE: CPT

## 2022-09-04 PROCEDURE — 85025 COMPLETE CBC W/AUTO DIFF WBC: CPT

## 2022-09-04 PROCEDURE — 74011000250 HC RX REV CODE- 250: Performed by: EMERGENCY MEDICINE

## 2022-09-04 PROCEDURE — 74011000636 HC RX REV CODE- 636: Performed by: EMERGENCY MEDICINE

## 2022-09-04 RX ORDER — LIDOCAINE 4 G/100G
1 PATCH TOPICAL
Status: DISCONTINUED | OUTPATIENT
Start: 2022-09-04 | End: 2022-09-04 | Stop reason: HOSPADM

## 2022-09-04 RX ORDER — LEVOFLOXACIN 750 MG/1
750 TABLET ORAL DAILY
Qty: 5 TABLET | Refills: 0 | Status: SHIPPED | OUTPATIENT
Start: 2022-09-04 | End: 2022-09-09

## 2022-09-04 RX ORDER — ALBUTEROL SULFATE 90 UG/1
2 AEROSOL, METERED RESPIRATORY (INHALATION)
Qty: 1 EACH | Refills: 0 | Status: SHIPPED | OUTPATIENT
Start: 2022-09-04

## 2022-09-04 RX ORDER — LIDOCAINE 4 G/100G
1 PATCH TOPICAL EVERY 24 HOURS
Qty: 7 PATCH | Refills: 0 | Status: SHIPPED | OUTPATIENT
Start: 2022-09-04 | End: 2022-09-11

## 2022-09-04 RX ORDER — KETOROLAC TROMETHAMINE 30 MG/ML
15 INJECTION, SOLUTION INTRAMUSCULAR; INTRAVENOUS
Status: COMPLETED | OUTPATIENT
Start: 2022-09-04 | End: 2022-09-04

## 2022-09-04 RX ADMIN — KETOROLAC TROMETHAMINE 15 MG: 30 INJECTION, SOLUTION INTRAMUSCULAR at 15:49

## 2022-09-04 RX ADMIN — IOPAMIDOL 100 ML: 755 INJECTION, SOLUTION INTRAVENOUS at 16:03

## 2022-09-04 NOTE — ED TRIAGE NOTES
Pt ambulated to the treatment area with a slow steady gait. Pt states \"on the 4th of July I broke 6 ribs on the left I was starting to get better and then this past week I was moving I didn't lift anything heavy but I was doing more. Yesterday I started getting pain and today its the worse pain I ever had even more than the original fracture its very severe pain if I take a deep breath. The pain is in my left mid back. I have had to cough at night I had pneumonia with rib break but I finished the antibiotics. \" Pt grimacing at times respirations even and non labored. Spo2 92%RA pt is a non smoker.

## 2022-09-04 NOTE — ED NOTES
Pt was discharged and given instructions by Dr Zeferino Lafleur . Pt verbalized good understanding of all discharge instructions,prescriptions and F/U care. All questions answered. Pt in stable condition on discharge.

## 2022-09-04 NOTE — ED PROVIDER NOTES
63-year-old female presents from home accompanied by a friend with a complaint of left-sided posterior chest pain. Patient states that on July 4 she broke 6 ribs on the left side. She was taken to the hospital in [address] where she had a CAT scan done. She was doing better and pain had improved over the past several days she has been doing a lot of lifting and packing up to move. Yesterday she said the pain started to get worse. Located on the left thoracic chest wall with no radiation. Is worsened with deep breathing and movement. She has had a cough at night with some wheezing. Denies any vomiting or diarrhea. No fevers at home. She has been taking over-the-counter pain medication but with little relief of her symptoms. Past medical history significant for Ben-Danlos syndrome, antiphospholipid antibody, lupus, stroke. Past Medical History:   Diagnosis Date    Anti-phospholipid syndrome ([address] 2005    DDD (degenerative disc disease) age 25    Ehler Danlos syndrome     joint laxity    H/O: stroke 2005    History of multiple strokes, PFO-had surgery, star flex in heart to seal hole.     Hypertension     Hypothyroidism     takes Synthoid    Infertility female     multiple miscarriages    Lupus ([address] 2005    MS (multiple sclerosis) ([facility] 2005     neurologist-Dr. Keron Crandall, fatigue, no focal deficits    Nausea & vomiting     Nephrolithiasis     Raynaud disease     Dr Lex Golden ( Rheum)     Stroke [facility])     last stroke was in 2005    Unspecified adverse effect of anesthesia     mother, sister and daughter difficult to arouse       Past Surgical History:   Procedure Laterality Date    BIOPSY BREAST      right breast, twice, benign    COLONOSCOPY N/A 10/7/2019    COLONOSCOPY performed by Leeanne Nolan MD at [facility] (1983, 1984) & ([address])    [address]  December 1997    [address]  09/2011    [address] [facility]    HX DILATION AND CURETTAGE      multiple    HX GYN      uterine ablation    HX LITHOTRIPSY      multiple    HX OTHER SURGICAL      bilateral axillary glands removed    HX SHOULDER ARTHROSCOPY      Right Shoulder Surgery twice (1996, 2006)    [facility]    HX UROLOGICAL  11/2018    bladder and uterine repair     VA REPAIR HEART WOUND  2005    History of multiple strokes, PFO-had surgery, star flex in heart to seal hole. Family History:   Problem Relation Age of Onset    Cancer Mother         emphysema    Cancer Father        Social History     Socioeconomic History    Marital status:      Spouse name: Not on file    Number of children: Not on file    Years of education: Not on file    Highest education level: Not on file   Occupational History    Not on file   Tobacco Use    Smoking status: Never    Smokeless tobacco: Never   Substance and Sexual Activity    Alcohol use: No     Comment:      Drug use: No    Sexual activity: Not Currently   Other Topics Concern    Not on file   Social History Narrative    Not on file     Social Determinants of Health     Financial Resource Strain: Not on file   Food Insecurity: Not on file   Transportation Needs: Not on file   Physical Activity: Not on file   Stress: Not on file   Social Connections: Not on file   Intimate Partner Violence: Not on file   Housing Stability: Not on file         ALLERGIES: Codeine, Imitrex [sumatriptan succinate], and Toradol [ketorolac]    Review of Systems   Constitutional:  Negative for fever. HENT:  Negative for facial swelling. Eyes:  Negative for visual disturbance. Respiratory:  Negative for chest tightness. Cardiovascular:  Negative for chest pain. Gastrointestinal:  Negative for abdominal pain. Genitourinary:  Negative for difficulty urinating and dysuria. Musculoskeletal:  Negative for arthralgias. Skin:  Negative for rash. Neurological:  Negative for headaches.    Hematological:  Negative for adenopathy. Psychiatric/Behavioral:  Negative for suicidal ideas. Vitals:    09/04/22 1445   BP: (!) 118/94   Pulse: 72   Resp: 18   Temp: 97.4 °F (36.3 °C)   SpO2: 92%   Weight: 71.7 kg (158 lb)   Height: 5' 7\" (1.702 m)            Physical Exam  Vitals and nursing note reviewed. Constitutional:       General: She is not in acute distress. Appearance: She is well-developed. HENT:      Head: Normocephalic and atraumatic. Eyes:      General: No scleral icterus. Conjunctiva/sclera: Conjunctivae normal.      Pupils: Pupils are equal, round, and reactive to light. Cardiovascular:      Rate and Rhythm: Normal rate. Heart sounds: No murmur heard. Pulmonary:      Effort: Pulmonary effort is normal. No respiratory distress. Abdominal:      General: There is no distension. Musculoskeletal:         General: Normal range of motion. Cervical back: Normal range of motion and neck supple. Skin:     General: Skin is warm and dry. Findings: No rash. Neurological:      Mental Status: She is alert and oriented to person, place, and time. MDM  Number of Diagnoses or Management Options  Pneumonia of left lung due to infectious organism, unspecified part of lung  Rib pain on right side  Diagnosis management comments: Assessment: Patient resting comfortably in no distress. Vital signs unremarkable. Her exam is reassuring with clear lungs no wheezing. Concerns for possible recurrence of her pneumonia, pneumothorax, musculoskeletal pain. Labs were unremarkable. CTA of her chest was showing possible multilobar infiltrates concerning for possible pneumonia. Its unclear if this represents residual findings from her previous CAT scan or if this is a new infection. Seems how she has had some symptoms of cough and chills. I think is reasonable to treat her with another course of antibiotics and albuterol inhaler.   Patient advised to follow-up with her primary care doctor at [facility] for follow-up treatment. She can return to the ER if has any worsening symptoms.        Amount and/or Complexity of Data Reviewed  Clinical lab tests: reviewed  Tests in the radiology section of CPT®: reviewed           Procedures

## 2022-11-29 NOTE — PERIOP NOTES
Patient has been evaluated by anesthesia pre-procedure. Patient alert and oriented. Vital signs will not be charted by the Endoscopy nurse. All vitals, airway, and loc are monitored by anesthesia staff throughout procedure.        Endoscope was pre-cleaned at bedside immediately following procedure by Joaquín Cox Protopic Counseling: Patient may experience a mild burning sensation during topical application. Protopic is not approved in children less than 2 years of age. There have been case reports of hematologic and skin malignancies in patients using topical calcineurin inhibitors although causality is questionable.

## 2023-02-27 ENCOUNTER — APPOINTMENT (OUTPATIENT)
Dept: CT IMAGING | Age: 58
End: 2023-02-27
Attending: EMERGENCY MEDICINE
Payer: MEDICARE

## 2023-02-27 ENCOUNTER — HOSPITAL ENCOUNTER (EMERGENCY)
Age: 58
Discharge: HOME | End: 2023-02-27
Attending: EMERGENCY MEDICINE
Payer: MEDICARE

## 2023-02-27 VITALS
DIASTOLIC BLOOD PRESSURE: 91 MMHG | WEIGHT: 168 LBS | HEIGHT: 67 IN | RESPIRATION RATE: 18 BRPM | HEART RATE: 84 BPM | SYSTOLIC BLOOD PRESSURE: 147 MMHG | OXYGEN SATURATION: 96 % | BODY MASS INDEX: 26.37 KG/M2 | TEMPERATURE: 98 F

## 2023-02-27 DIAGNOSIS — R10.9 ACUTE FLANK PAIN: Primary | ICD-10-CM

## 2023-02-27 DIAGNOSIS — N20.0 RENAL STONES: ICD-10-CM

## 2023-02-27 LAB
ALBUMIN SERPL-MCNC: 3.5 G/DL (ref 3.5–5)
ALBUMIN/GLOB SERPL: 0.8 (ref 1.1–2.2)
ALP SERPL-CCNC: 83 U/L (ref 45–117)
ALT SERPL-CCNC: 20 U/L (ref 12–78)
ANION GAP SERPL CALC-SCNC: 5 MMOL/L (ref 5–15)
APPEARANCE UR: CLEAR
AST SERPL-CCNC: 15 U/L (ref 15–37)
BACTERIA URNS QL MICRO: NEGATIVE /HPF
BASOPHILS # BLD: 0.1 K/UL (ref 0–0.1)
BASOPHILS NFR BLD: 1 % (ref 0–1)
BILIRUB SERPL-MCNC: 0.2 MG/DL (ref 0.2–1)
BILIRUB UR QL: NEGATIVE
BUN SERPL-MCNC: 16 MG/DL (ref 6–20)
BUN/CREAT SERPL: 19 (ref 12–20)
CALCIUM SERPL-MCNC: 9.4 MG/DL (ref 8.5–10.1)
CHLORIDE SERPL-SCNC: 101 MMOL/L (ref 97–108)
CO2 SERPL-SCNC: 32 MMOL/L (ref 21–32)
COLOR UR: ABNORMAL
COMMENT, HOLDF: NORMAL
CREAT SERPL-MCNC: 0.84 MG/DL (ref 0.55–1.02)
CRP SERPL-MCNC: 0.58 MG/DL (ref 0–0.6)
DIFFERENTIAL METHOD BLD: ABNORMAL
EOSINOPHIL # BLD: 0.4 K/UL (ref 0–0.4)
EOSINOPHIL NFR BLD: 4 % (ref 0–7)
EPITH CASTS URNS QL MICRO: ABNORMAL /LPF
ERYTHROCYTE [DISTWIDTH] IN BLOOD BY AUTOMATED COUNT: 14 % (ref 11.5–14.5)
GLOBULIN SER CALC-MCNC: 4.6 G/DL (ref 2–4)
GLUCOSE SERPL-MCNC: 105 MG/DL (ref 65–100)
GLUCOSE UR STRIP.AUTO-MCNC: NEGATIVE MG/DL
HCT VFR BLD AUTO: 40.6 % (ref 35–47)
HGB BLD-MCNC: 12.6 G/DL (ref 11.5–16)
HGB UR QL STRIP: NEGATIVE
HYALINE CASTS URNS QL MICRO: ABNORMAL /LPF (ref 0–2)
IMM GRANULOCYTES # BLD AUTO: 0 K/UL (ref 0–0.04)
IMM GRANULOCYTES NFR BLD AUTO: 0 % (ref 0–0.5)
KETONES UR QL STRIP.AUTO: NEGATIVE MG/DL
LEUKOCYTE ESTERASE UR QL STRIP.AUTO: ABNORMAL
LIPASE SERPL-CCNC: 119 U/L (ref 73–393)
LYMPHOCYTES # BLD: 2.8 K/UL (ref 0.8–3.5)
LYMPHOCYTES NFR BLD: 31 % (ref 12–49)
MCH RBC QN AUTO: 27.8 PG (ref 26–34)
MCHC RBC AUTO-ENTMCNC: 31 G/DL (ref 30–36.5)
MCV RBC AUTO: 89.4 FL (ref 80–99)
MONOCYTES # BLD: 0.8 K/UL (ref 0–1)
MONOCYTES NFR BLD: 10 % (ref 5–13)
NEUTS SEG # BLD: 4.7 K/UL (ref 1.8–8)
NEUTS SEG NFR BLD: 54 % (ref 32–75)
NITRITE UR QL STRIP.AUTO: NEGATIVE
NRBC # BLD: 0 K/UL (ref 0–0.01)
NRBC BLD-RTO: 0 PER 100 WBC
PH UR STRIP: 6 (ref 5–8)
PLATELET # BLD AUTO: 291 K/UL (ref 150–400)
PMV BLD AUTO: 8.1 FL (ref 8.9–12.9)
POTASSIUM SERPL-SCNC: 3.8 MMOL/L (ref 3.5–5.1)
PROT SERPL-MCNC: 8.1 G/DL (ref 6.4–8.2)
PROT UR STRIP-MCNC: NEGATIVE MG/DL
RBC # BLD AUTO: 4.54 M/UL (ref 3.8–5.2)
RBC #/AREA URNS HPF: ABNORMAL /HPF (ref 0–5)
SAMPLES BEING HELD,HOLD: NORMAL
SODIUM SERPL-SCNC: 138 MMOL/L (ref 136–145)
SP GR UR REFRACTOMETRY: 1.02 (ref 1–1.03)
UA: UC IF INDICATED,UAUC: ABNORMAL
UROBILINOGEN UR QL STRIP.AUTO: 0.2 EU/DL (ref 0.2–1)
WBC # BLD AUTO: 8.8 K/UL (ref 3.6–11)
WBC URNS QL MICRO: ABNORMAL /HPF (ref 0–4)

## 2023-02-27 PROCEDURE — 80053 COMPREHEN METABOLIC PANEL: CPT

## 2023-02-27 PROCEDURE — 83690 ASSAY OF LIPASE: CPT

## 2023-02-27 PROCEDURE — 86140 C-REACTIVE PROTEIN: CPT

## 2023-02-27 PROCEDURE — 81001 URINALYSIS AUTO W/SCOPE: CPT

## 2023-02-27 PROCEDURE — 74176 CT ABD & PELVIS W/O CONTRAST: CPT

## 2023-02-27 PROCEDURE — 85025 COMPLETE CBC W/AUTO DIFF WBC: CPT

## 2023-02-27 PROCEDURE — 87086 URINE CULTURE/COLONY COUNT: CPT

## 2023-02-27 PROCEDURE — 99284 EMERGENCY DEPT VISIT MOD MDM: CPT

## 2023-02-27 PROCEDURE — 96374 THER/PROPH/DIAG INJ IV PUSH: CPT

## 2023-02-27 PROCEDURE — 36415 COLL VENOUS BLD VENIPUNCTURE: CPT

## 2023-02-27 PROCEDURE — 74011250636 HC RX REV CODE- 250/636: Performed by: EMERGENCY MEDICINE

## 2023-02-27 RX ORDER — NAPROXEN 500 MG/1
500 TABLET ORAL 2 TIMES DAILY WITH MEALS
Qty: 20 TABLET | Refills: 0 | Status: SHIPPED | OUTPATIENT
Start: 2023-02-27

## 2023-02-27 RX ORDER — ONDANSETRON 2 MG/ML
8 INJECTION INTRAMUSCULAR; INTRAVENOUS
Status: COMPLETED | OUTPATIENT
Start: 2023-02-27 | End: 2023-02-27

## 2023-02-27 RX ADMIN — SODIUM CHLORIDE 1000 ML: 9 INJECTION, SOLUTION INTRAVENOUS at 15:14

## 2023-02-27 RX ADMIN — ONDANSETRON 8 MG: 2 INJECTION INTRAMUSCULAR; INTRAVENOUS at 15:15

## 2023-02-27 NOTE — ED PROVIDER NOTES
59-year-old female with a past medical history significant for antiphospholipid syndrome, Ben-Danlos, CVA, hypertension, hypothyroidism who presents to the ER for evaluation for right flank pain for 3 days, severity 5 out of 10 accompanied by nausea and diaphoresis. The patient also complains of intermittent fever and chills, and urinary symptoms accompanied by burning, frequency or urgency. Patient denies any neck and back pain, chest pain, shortness of breath, nausea or vomiting, abdominal pain, diarrhea constipation, sick contact, skin rash or recent travel. The patient states that she was seen by PCP and has had  rounds of antibiotic thus far. Past Medical History:   Diagnosis Date    Anti-phospholipid syndrome ([facility] 2005    DDD (degenerative disc disease) age 25    Ehler Danlos syndrome     joint laxity    H/O: stroke 2005    History of multiple strokes, PFO-had surgery, star flex in heart to seal hole.     Hypertension     Hypothyroidism     takes Synthoid    Infertility female     multiple miscarriages    Lupus ([facility] [address] 2005    MS (multiple sclerosis) ([facility] [address] 2005     neurologist-Dr. Alex Minor, fatigue, no focal deficits    Nausea & vomiting     Nephrolithiasis     Raynaud disease     Dr Barb Alvarez ( Rheum)     Stroke [facility])     last stroke was in 2005    Unspecified adverse effect of anesthesia     mother, sister and daughter difficult to arouse       Past Surgical History:   Procedure Laterality Date    BIOPSY BREAST      right breast, twice, benign    COLONOSCOPY N/A 10/7/2019    COLONOSCOPY performed by Rosana Nixon MD at [facility] (1983, 1984) & ([address])    [address] [facility] [address] [address]  [address] 1997    [address]  09/2011    HX CHOLECYSTECTOMY  1994    HX [address]      multiple    HX GYN      uterine ablation    HX LITHOTRIPSY      multiple    HX OTHER SURGICAL      bilateral axillary glands removed    HX SHOULDER ARTHROSCOPY Right Shoulder Surgery twice (1996, 2006)    [facility] UROLOGICAL  11/2018    bladder and uterine repair     ME REPAIR HEART WOUND  2005    History of multiple strokes, PFO-had surgery, star flex in heart to seal hole. Family History:   Problem Relation Age of Onset    Cancer Mother         emphysema    Cancer Father        Social History     Socioeconomic History    Marital status:      Spouse name: Not on file    Number of children: Not on file    Years of education: Not on file    Highest education level: Not on file   Occupational History    Not on file   Tobacco Use    Smoking status: Never    Smokeless tobacco: Never   Substance and Sexual Activity    Alcohol use: No     Comment:      Drug use: No    Sexual activity: Not Currently   Other Topics Concern    Not on file   Social History Narrative    Not on file     Social Determinants of Health     Financial Resource Strain: Not on file   Food Insecurity: Not on file   Transportation Needs: Not on file   Physical Activity: Not on file   Stress: Not on file   Social Connections: Not on file   Intimate Partner Violence: Not on file   Housing Stability: Not on file         ALLERGIES: Codeine, Imitrex [sumatriptan succinate], and Toradol [ketorolac]    Review of Systems   All other systems reviewed and are negative. Vitals:    02/27/23 1325   BP: (!) 147/91   Pulse: 84   Resp: 18   Temp: 98 °F (36.7 °C)   SpO2: 96%   Weight: 76.2 kg (168 lb)   Height: 5' 7\" (1.702 m)            Physical Exam  Vitals and nursing note reviewed. Exam conducted with a chaperone present. CONSTITUTIONAL: Well-appearing; well-nourished; in no apparent distress  HEAD: Normocephalic; atraumatic  EYES: PERRL; EOM intact; conjunctiva and sclera are clear bilaterally. ENT: No rhinorrhea; normal pharynx with no tonsillar hypertrophy; mucous membranes pink/moist, no erythema, no exudate.   NECK: Supple; non-tender; no cervical lymphadenopathy  CARD: Normal S1, S2; no murmurs, rubs, or gallops. Regular rate and rhythm. RESP: Normal respiratory effort; breath sounds clear and equal bilaterally; no wheezes, rhonchi, or rales. ABD: Normal bowel sounds; non-distended; non-tender; no palpable organomegaly, no masses, no bruits. Back Exam: Normal inspection; no vertebral point tenderness, no CVA tenderness. Normal range of motion. EXT: Normal ROM in all four extremities; non-tender to palpation; no swelling or deformity; distal pulses are normal, no edema. SKIN: Warm; dry; no rash. NEURO:Alert and oriented x 3, coherent, GRIS-XII grossly intact, sensory and motor are non-focal.      Medical Decision Making  Amount and/or Complexity of Data Reviewed  Labs: ordered. Radiology: ordered. Risk  Prescription drug management. Procedures  Progress Note:   Pt has been reexamined by Cordelia Freire MD. Pt is feeling much better. Symptoms have improved. All available results have been reviewed with pt and any available family. Urinalysis was unequivocal at this time. Urine culture has been sent. The patient is currently on Augmentin. CT scan is positive for nonobstructive bilateral renal stones among other findings. Pt understands sx, dx, and tx in ED. Care plan has been outlined and questions have been answered. Pt is ready to go home. Will send home on flank and back pain instruction. Prescription naproxen. . Outpatient referral with PCP as needed. Written by Cordelia Freire MD,11:27 AM    .   .

## 2023-02-28 LAB
BACTERIA SPEC CULT: NORMAL
SERVICE CMNT-IMP: NORMAL

## 2023-06-08 ENCOUNTER — TELEPHONE (OUTPATIENT)
Age: 58
End: 2023-06-08

## 2023-10-03 ENCOUNTER — TELEPHONE (OUTPATIENT)
Age: 58
End: 2023-10-03

## 2023-10-03 ENCOUNTER — OFFICE VISIT (OUTPATIENT)
Age: 58
End: 2023-10-03
Payer: MEDICARE

## 2023-10-03 VITALS
SYSTOLIC BLOOD PRESSURE: 140 MMHG | OXYGEN SATURATION: 100 % | TEMPERATURE: 96 F | BODY MASS INDEX: 26.37 KG/M2 | WEIGHT: 168 LBS | DIASTOLIC BLOOD PRESSURE: 90 MMHG | HEIGHT: 67 IN | HEART RATE: 75 BPM

## 2023-10-03 DIAGNOSIS — R43.9 DISTURBANCE OF SMELL: Primary | ICD-10-CM

## 2023-10-03 DIAGNOSIS — G43.E11 INTRACTABLE CHRONIC MIGRAINE WITH AURA WITH STATUS MIGRAINOSUS: ICD-10-CM

## 2023-10-03 DIAGNOSIS — G43.E11 INTRACTABLE CHRONIC MIGRAINE WITH AURA WITH STATUS MIGRAINOSUS: Primary | ICD-10-CM

## 2023-10-03 PROCEDURE — G8428 CUR MEDS NOT DOCUMENT: HCPCS | Performed by: PSYCHIATRY & NEUROLOGY

## 2023-10-03 PROCEDURE — 99205 OFFICE O/P NEW HI 60 MIN: CPT | Performed by: PSYCHIATRY & NEUROLOGY

## 2023-10-03 PROCEDURE — 3077F SYST BP >= 140 MM HG: CPT | Performed by: PSYCHIATRY & NEUROLOGY

## 2023-10-03 PROCEDURE — 3080F DIAST BP >= 90 MM HG: CPT | Performed by: PSYCHIATRY & NEUROLOGY

## 2023-10-03 PROCEDURE — G8484 FLU IMMUNIZE NO ADMIN: HCPCS | Performed by: PSYCHIATRY & NEUROLOGY

## 2023-10-03 PROCEDURE — G8419 CALC BMI OUT NRM PARAM NOF/U: HCPCS | Performed by: PSYCHIATRY & NEUROLOGY

## 2023-10-03 RX ORDER — FLUTICASONE FUROATE, UMECLIDINIUM BROMIDE AND VILANTEROL TRIFENATATE 100; 62.5; 25 UG/1; UG/1; UG/1
POWDER RESPIRATORY (INHALATION)
COMMUNITY
Start: 2023-07-27

## 2023-10-03 RX ORDER — RIMEGEPANT SULFATE 75 MG/75MG
TABLET, ORALLY DISINTEGRATING ORAL
Qty: 16 TABLET | Refills: 5 | Status: SHIPPED | OUTPATIENT
Start: 2023-10-03

## 2023-10-03 NOTE — TELEPHONE ENCOUNTER
Patient seen By Dr. Veronica Pihlip today and was referred to Dr. Víctor Marie for Botox for migraines.    Need RX for Botox

## 2023-10-03 NOTE — PROGRESS NOTES
Discussed diagnosis, pathophysiology prognosis, available treatment and formulating plan. Reviewed test results. All questions were answered. 2. Reviewed medical records in [facility]  3. Since patient tried and failed multiple meds (Emgality (made her suicidal), Fioricet daily, Topamax, Amitriptyline, Nortriptyline, Propranolol, Depakote, Zoloft, Imitrex (chest pain), Toradol, Excedrin), will refer patient to Dr Ayden Palma for Botox for chronic daily migraine  4. Due to prior history of stroke, we cannot give triptans. Will try her on Nurtec 75 mg prn to abort headaches  5. Discussed the need for headache diary and went through the list that can trigger headache (I.e. stress, chocolate)        Return for after Botox. Thank you for the consultation      Nichole Parsons MD  Diplomate, [address] [facility] Board of Psychiatry and Neurology  Diplomate, Neuromuscular Medicine  Diplomate, American Board of Electrodiagnostic Medicine        I spent total of 80 mins with the patient, greater than 50% of the visit was spent on providing counseling and coordination of care.     CC: Pam Adamson MD  Fax: [phone]

## 2023-10-05 ENCOUNTER — TELEPHONE (OUTPATIENT)
Age: 58
End: 2023-10-05

## 2023-10-05 NOTE — TELEPHONE ENCOUNTER
Patient needs to reschedule her EEG Procedure    She would prefer be scheduled anytime after the first week in November

## 2023-10-19 ENCOUNTER — TELEPHONE (OUTPATIENT)
Age: 58
End: 2023-10-19

## 2023-10-19 NOTE — TELEPHONE ENCOUNTER
Called , called [facility], and spoke with Antoni Rodriguez, to have Botox delivered but prior to them delivering the Botox you need to call [facility] [phone] to give them consent to deliver the Botox to our office. Antoni Rodriguez stated they have tried to call you multiple times to get consent but have not heard back. Please try to call so you can start your Botox treatment.

## 2023-10-25 ENCOUNTER — TELEPHONE (OUTPATIENT)
Age: 58
End: 2023-10-25

## 2023-10-25 NOTE — TELEPHONE ENCOUNTER
RE:Nurte      Approvedtoday  Approved. This drug has been approved under the Member's Medicare Part D benefit. Approved quantity: 16 units per 30 day(s). You may fill up to a 90 day supply except for those on Specialty Tier 5, which can be filled up to a 30 day supply.  Please call the pharmacy to process the prescription claim        Nurse notified

## 2023-10-25 NOTE — TELEPHONE ENCOUNTER
Patient requesting [place] Nurtec  and  Rx Maxalt    Patient is requesting EEG      [facility]   [phone]            or  Fax  [phone]

## 2023-10-26 ENCOUNTER — TELEPHONE (OUTPATIENT)
Age: 58
End: 2023-10-26

## 2023-10-26 NOTE — TELEPHONE ENCOUNTER
Lex Shea Men at [address] will be delivered to [address] [place] [address] on 10/31/2023.

## 2023-10-27 NOTE — TELEPHONE ENCOUNTER
Called pt.  verified. Inform pt that the Nurtec has been approved. Pt is aware. Inform pt that Dr. Mirza Morales is out of the office and will be returning on 10/30/23. Pt verbalizes understanding but still would like Dr. Mirza Morales to send the Maxalt medication to her [facility] pharmacy.

## 2023-10-27 NOTE — TELEPHONE ENCOUNTER
Patient requesting a call to discuss whether the doctor will call in a prescription for Maxalt    She is requesting this be sent to her pharmacy today because she will be leaving tomorrow for a cruise.

## 2023-10-31 ENCOUNTER — TELEPHONE (OUTPATIENT)
Age: 58
End: 2023-10-31

## 2023-11-01 NOTE — TELEPHONE ENCOUNTER
Called pt. No answer left message on VM to call office. Calling to Inform patient that Dr. Maxine Hatch states because of history of stroke, she should not take Maxalt. Advise to take Nurtec instead which is safe for patients who had prior stroke.

## 2023-11-06 ENCOUNTER — TELEPHONE (OUTPATIENT)
Age: 58
End: 2023-11-06

## 2023-11-06 NOTE — TELEPHONE ENCOUNTER
Patient would like a call regarding the new script for medication Maxhalt for her Migraines. Stated it was discussed on her last visit. When she picked up her other script it was not there.

## 2023-11-09 NOTE — TELEPHONE ENCOUNTER
Called pt.  verified. Inform pt that Dr. Catherine Herring states she is not suppose to take Maxalt anymore because of history of stroke. [facility]. Pt verbalizes understanding.

## 2023-11-16 ENCOUNTER — TELEPHONE (OUTPATIENT)
Age: 58
End: 2023-11-16

## 2023-11-17 NOTE — TELEPHONE ENCOUNTER
Re: Botox May and Feng Sánchez    Next appt is 11/22/2023, med is already in office, Pt has straight Medicare, beginning in 2024 pt will become Buy and Bill and med will be shipped through [employer].

## 2023-11-22 ENCOUNTER — PROCEDURE VISIT (OUTPATIENT)
Age: 58
End: 2023-11-22

## 2023-11-22 DIAGNOSIS — G43.E11 INTRACTABLE CHRONIC MIGRAINE WITH AURA WITH STATUS MIGRAINOSUS: Primary | ICD-10-CM

## 2023-11-22 NOTE — PROGRESS NOTES
[address] [facility]  OFFICE PROCEDURE NOTE        Chart reviewed for the following:   Cassidy Dinero MD, have reviewed the History, Physical and updated the Allergic reactions for Rakel DIAZ Coreas     TIME OUT performed immediately prior to start of procedure:   Cassidy Dinero MD, have performed the following reviews on Rakel DIAZ Coreas prior to the start of the procedure:            * Patient was identified by name and date of birth   * Agreement on procedure being performed was verified  * Risks and Benefits explained to the patient  * Procedure site verified and marked as necessary  * Patient was positioned for comfort  * Consent was signed and verified     Time: 1050  Date of procedure: 11/22/2023  Procedure performed by:  Dr. Cirilo Diallo  Provider assisted by: None  Patient assisted by: none  How tolerated by patient: well  Comments: None    Botox Injection Note    Indication:   Patient has chronic migraine, and has tried/ failed multiple headache preventive medications (see clinic notes for details). She presents for initial Botox Injection to reduce overall headache frequency, including migraines. Procedure:   Botox concentration: 200 units in 4 ml of preservative-free normal saline. 31 sites injections, distribution as follow        Units/site Sites Sides Subtotal    Procerus 5 1 1 5    5 1 2 10   Frontalis 5 2 2 20   Temporalis 5 4 2 40   Occipitalis 5 3 2 30   Upper cervical paraspinalis 5 2 2 20   Trapezius 5 3 2 30          200 units Botox were reconstituted, 155 units injected as above with 45 units of wastage. Lot. No.  C4  Exp. Date 02/2026     Patient tolerated procedure well.      RTC in 3 months

## 2024-10-18 NOTE — PROGRESS NOTES
Neurology Progress Note    Patient ID:  Rakel Coreas  [id number]  59 y.o.  1965      Subjective:   History:  Rakel Coreas is a 58 y.o. female who  has a past medical history of Lupus on Plaquenil, Anti-phospholipid syndrome (HCC), DDD (degenerative disc disease), H/O: stroke, Hypertension, Hypothyroidism, Lupus (HCC), MS (multiple sclerosis) ([facility]), Nephrolithiasis, Raynaud disease, Stroke ([facility]), who since her teenage yrs, noted headaches, behind her eyes,  occurring 15/ month, lasting for 2-3 days, tightness, sharp, 9/10, (+) nausea (+) vomiting (+) photophobia (+) phonophobia (+) visual auras - blotches (+) stress with sister being sick. Was seen by [facility] neurologist Dr Esquivel. Tried on Emgality (made her suicidal). EEG was discussed but was not done. Tried Fioricet daily, Topamax (nausea), Amitriptyline, Nortriptyline, Propranolol, Depakote, Zoloft, Imitrex (chest pain), Toradol, Excedrin, Lyrica, Gabapentin. MRI brain 2018 showing non-specific white matter changes.    On 11/22/23, patient had Botox c/o Dr Moreno but has not been seen since. Botox did helped but was lost to follow up because she went to [place].    Currently getting daily headache and chronic neck pain.    Problem sleeping due to joint pains        End of June 2024, patient was in [place] and slipped on her L side at sister's house and hit the [facility] house. (-) LOC (-) visible bruising. Went to the ER at [place] and CT Head showed subependymal nodules along the lateral ventricular wall, for example on the right measuring 5 x 8 mm. 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 CTA Head and neck was WNL. CT Cervical spine (6/29/24): Surgical changes following anterior cervical diskectomy at C4-5, C5-6, and C6-7 with vertebral body fusion. There is no fracture or traumatic subluxation. The craniocervical junction is maintained, including the occipital-cervical articulation, atlantoaxial

## 2024-10-21 ENCOUNTER — OFFICE VISIT (OUTPATIENT)
Age: 59
End: 2024-10-21
Payer: MEDICARE

## 2024-10-21 VITALS
HEIGHT: 66 IN | DIASTOLIC BLOOD PRESSURE: 70 MMHG | HEART RATE: 94 BPM | OXYGEN SATURATION: 96 % | BODY MASS INDEX: 27.12 KG/M2 | TEMPERATURE: 95.7 F | SYSTOLIC BLOOD PRESSURE: 120 MMHG

## 2024-10-21 DIAGNOSIS — G44.86 CERVICOGENIC HEADACHE: ICD-10-CM

## 2024-10-21 DIAGNOSIS — G43.E11 INTRACTABLE CHRONIC MIGRAINE WITH AURA WITH STATUS MIGRAINOSUS: ICD-10-CM

## 2024-10-21 DIAGNOSIS — Q04.8: ICD-10-CM

## 2024-10-21 DIAGNOSIS — R51.9 INTRACTABLE HEADACHE, UNSPECIFIED CHRONICITY PATTERN, UNSPECIFIED HEADACHE TYPE: Primary | ICD-10-CM

## 2024-10-21 PROCEDURE — 99215 OFFICE O/P EST HI 40 MIN: CPT | Performed by: PSYCHIATRY & NEUROLOGY

## 2024-10-21 PROCEDURE — 3078F DIAST BP <80 MM HG: CPT | Performed by: PSYCHIATRY & NEUROLOGY

## 2024-10-21 PROCEDURE — 3074F SYST BP LT 130 MM HG: CPT | Performed by: PSYCHIATRY & NEUROLOGY

## 2024-10-21 PROCEDURE — G8428 CUR MEDS NOT DOCUMENT: HCPCS | Performed by: PSYCHIATRY & NEUROLOGY

## 2024-10-21 PROCEDURE — G8419 CALC BMI OUT NRM PARAM NOF/U: HCPCS | Performed by: PSYCHIATRY & NEUROLOGY

## 2024-10-21 PROCEDURE — G8484 FLU IMMUNIZE NO ADMIN: HCPCS | Performed by: PSYCHIATRY & NEUROLOGY

## 2024-10-21 PROCEDURE — 3017F COLORECTAL CA SCREEN DOC REV: CPT | Performed by: PSYCHIATRY & NEUROLOGY

## 2024-10-21 PROCEDURE — 4004F PT TOBACCO SCREEN RCVD TLK: CPT | Performed by: PSYCHIATRY & NEUROLOGY

## 2024-10-30 RX ORDER — RIMEGEPANT SULFATE 75 MG/75MG
TABLET, ORALLY DISINTEGRATING ORAL
Qty: 16 TABLET | Refills: 5 | Status: SHIPPED | OUTPATIENT
Start: 2024-10-30

## 2024-11-06 ENCOUNTER — TELEPHONE (OUTPATIENT)
Age: 59
End: 2024-11-06

## 2024-11-06 NOTE — TELEPHONE ENCOUNTER
Is requesting a call to r/s her Botox appt.    Please contact after today as she will be in the hospital today.

## 2024-11-13 ENCOUNTER — TELEPHONE (OUTPATIENT)
Age: 59
End: 2024-11-13

## 2024-11-13 NOTE — TELEPHONE ENCOUNTER
----- Message from Dr. Lisandro Moreno MD sent at 11/13/2024 10:02 AM EST -----  Regarding: Botox  See if patient can come in for her Botox treatment 11/20/2024 at 3:30 pm.

## 2025-01-24 ENCOUNTER — TELEPHONE (OUTPATIENT)
Age: 60
End: 2025-01-24

## 2025-01-24 NOTE — TELEPHONE ENCOUNTER
Botox Drug Acquisition: BUY AND BILL  Drug: BOTOX 200 units Dx: G43.E11  Insurance: Medicare Part A and B  Submission Type: [employer] fax [phone]  Hospitals in [place]:   CPT: 71093  Reference #: [id number]  Approval Range: 01/27/25 to 05/27/25    Scanned submission to media for records  Scanned approval letter to media

## 2025-02-16 ENCOUNTER — HOSPITAL ENCOUNTER (EMERGENCY)
Facility: HOSPITAL | Age: 60
Discharge: HOME | End: 2025-02-16
Attending: STUDENT IN AN ORGANIZED HEALTH CARE EDUCATION/TRAINING PROGRAM
Payer: MEDICARE

## 2025-02-16 VITALS
OXYGEN SATURATION: 95 % | TEMPERATURE: 97.9 F | HEART RATE: 79 BPM | WEIGHT: 174.6 LBS | RESPIRATION RATE: 18 BRPM | BODY MASS INDEX: 28.18 KG/M2 | DIASTOLIC BLOOD PRESSURE: 108 MMHG | SYSTOLIC BLOOD PRESSURE: 153 MMHG

## 2025-02-16 DIAGNOSIS — J10.1 INFLUENZA A: Primary | ICD-10-CM

## 2025-02-16 LAB
FLUAV RNA SPEC QL NAA+PROBE: DETECTED
FLUBV RNA SPEC QL NAA+PROBE: NOT DETECTED
SARS-COV-2 RNA RESP QL NAA+PROBE: NOT DETECTED
SOURCE: ABNORMAL

## 2025-02-16 PROCEDURE — 99283 EMERGENCY DEPT VISIT LOW MDM: CPT

## 2025-02-16 PROCEDURE — 87636 SARSCOV2 & INF A&B AMP PRB: CPT

## 2025-02-16 PROCEDURE — 6370000000 HC RX 637 (ALT 250 FOR IP): Performed by: STUDENT IN AN ORGANIZED HEALTH CARE EDUCATION/TRAINING PROGRAM

## 2025-02-16 RX ORDER — OSELTAMIVIR PHOSPHATE 75 MG/1
75 CAPSULE ORAL 2 TIMES DAILY
Qty: 10 CAPSULE | Refills: 0 | Status: SHIPPED | OUTPATIENT
Start: 2025-02-16 | End: 2025-02-21

## 2025-02-16 RX ORDER — ONDANSETRON 4 MG/1
4 TABLET, ORALLY DISINTEGRATING ORAL ONCE
Status: COMPLETED | OUTPATIENT
Start: 2025-02-16 | End: 2025-02-16

## 2025-02-16 RX ORDER — ONDANSETRON 4 MG/1
4 TABLET, ORALLY DISINTEGRATING ORAL 3 TIMES DAILY PRN
Qty: 21 TABLET | Refills: 0 | Status: SHIPPED | OUTPATIENT
Start: 2025-02-16

## 2025-02-16 RX ADMIN — ONDANSETRON 4 MG: 4 TABLET, ORALLY DISINTEGRATING ORAL at 18:46

## 2025-02-16 ASSESSMENT — PAIN DESCRIPTION - DESCRIPTORS: DESCRIPTORS: ACHING

## 2025-02-16 ASSESSMENT — PAIN DESCRIPTION - LOCATION: LOCATION: HEAD

## 2025-02-16 ASSESSMENT — PAIN - FUNCTIONAL ASSESSMENT: PAIN_FUNCTIONAL_ASSESSMENT: 0-10

## 2025-02-16 ASSESSMENT — PAIN SCALES - GENERAL: PAINLEVEL_OUTOF10: 6

## 2025-02-16 NOTE — ED TRIAGE NOTES
Pt states she has been sick, went to see her pcp, then her grandson got her sick, she's has body aches, throwing up, diarrhea she is suppposed to see a GI doctor and autoimmune. Took tylenol prior to arrival

## 2025-02-16 NOTE — ED PROVIDER NOTES
positive.  Was started on Tamiflu and given Zofran for home discharged in ED no acute distress nontoxic appearance.    Risk  Prescription drug management.            REASSESSMENT            CONSULTS:  None    PROCEDURES:  Unless otherwise noted below, none     Procedures      FINAL IMPRESSION      1. Influenza A          DISPOSITION/PLAN   DISPOSITION Decision To Discharge 02/16/2025 07:22:51 PM      PATIENT REFERRED TO:  Blanco West MD  [address] [facility] [address] [phone]            DISCHARGE MEDICATIONS:  Discharge Medication List as of 2/16/2025  7:24 PM        START taking these medications    Details   oseltamivir (TAMIFLU) 75 MG capsule Take 1 capsule by mouth 2 times daily for 5 days, Disp-10 capsule, R-0Normal      ondansetron (ZOFRAN-ODT) 4 MG disintegrating tablet Take 1 tablet by mouth 3 times daily as needed for Nausea or Vomiting, Disp-21 tablet, R-0Normal               (Please note that portions of this note were completed with a voice recognition program.  Efforts were made to edit the dictations but occasionally words are mis-transcribed.)    Russell Kruger DO (electronically signed)  Emergency Attending Physician / Physician Assistant / Nurse Practitioner              Russell Kruger DO  02/17/25 [id number]

## 2025-02-17 ASSESSMENT — ENCOUNTER SYMPTOMS
SHORTNESS OF BREATH: 0
NAUSEA: 1

## 2025-03-10 ENCOUNTER — TRANSCRIBE ORDERS (OUTPATIENT)
Facility: HOSPITAL | Age: 60
End: 2025-03-10

## 2025-03-10 DIAGNOSIS — R10.13 EPIGASTRIC PAIN: ICD-10-CM

## 2025-03-10 DIAGNOSIS — R10.33 PERIUMBILICAL PAIN: ICD-10-CM

## 2025-03-10 DIAGNOSIS — R63.4 WEIGHT LOSS: ICD-10-CM

## 2025-03-10 DIAGNOSIS — R11.10 VOMITING, UNSPECIFIED VOMITING TYPE, UNSPECIFIED WHETHER NAUSEA PRESENT: Primary | ICD-10-CM

## 2025-03-10 DIAGNOSIS — Z80.0 FAMILY HISTORY OF COLON CANCER: ICD-10-CM

## 2025-03-26 ENCOUNTER — HOSPITAL ENCOUNTER (EMERGENCY)
Facility: HOSPITAL | Age: 60
Discharge: HOME | End: 2025-03-26
Attending: STUDENT IN AN ORGANIZED HEALTH CARE EDUCATION/TRAINING PROGRAM
Payer: MEDICARE

## 2025-03-26 ENCOUNTER — APPOINTMENT (OUTPATIENT)
Facility: HOSPITAL | Age: 60
End: 2025-03-26
Payer: MEDICARE

## 2025-03-26 VITALS
TEMPERATURE: 98.7 F | RESPIRATION RATE: 17 BRPM | HEART RATE: 74 BPM | OXYGEN SATURATION: 92 % | WEIGHT: 175 LBS | SYSTOLIC BLOOD PRESSURE: 133 MMHG | HEIGHT: 66 IN | BODY MASS INDEX: 28.12 KG/M2 | DIASTOLIC BLOOD PRESSURE: 76 MMHG

## 2025-03-26 DIAGNOSIS — R74.8 ELEVATED LIVER ENZYMES: ICD-10-CM

## 2025-03-26 DIAGNOSIS — E87.6 HYPOKALEMIA: ICD-10-CM

## 2025-03-26 DIAGNOSIS — R53.83 FATIGUE, UNSPECIFIED TYPE: ICD-10-CM

## 2025-03-26 DIAGNOSIS — R11.2 NAUSEA AND VOMITING, UNSPECIFIED VOMITING TYPE: Primary | ICD-10-CM

## 2025-03-26 LAB
ALBUMIN SERPL-MCNC: 3.4 G/DL (ref 3.5–5)
ALBUMIN/GLOB SERPL: 0.7 (ref 1.1–2.2)
ALP SERPL-CCNC: 178 U/L (ref 45–117)
ALT SERPL-CCNC: 100 U/L (ref 12–78)
ANION GAP SERPL CALC-SCNC: 5 MMOL/L (ref 2–12)
AST SERPL-CCNC: 76 U/L (ref 15–37)
BASOPHILS # BLD: 0.12 K/UL (ref 0–0.1)
BASOPHILS NFR BLD: 1 % (ref 0–1)
BILIRUB SERPL-MCNC: 0.5 MG/DL (ref 0.2–1)
BUN SERPL-MCNC: 11 MG/DL (ref 6–20)
BUN/CREAT SERPL: 12 (ref 12–20)
CALCIUM SERPL-MCNC: 9.1 MG/DL (ref 8.5–10.1)
CHLORIDE SERPL-SCNC: 99 MMOL/L (ref 97–108)
CO2 SERPL-SCNC: 30 MMOL/L (ref 21–32)
CREAT SERPL-MCNC: 0.95 MG/DL (ref 0.55–1.02)
D DIMER PPP FEU-MCNC: 3.48 MG/L FEU (ref 0–0.65)
DIFFERENTIAL METHOD BLD: ABNORMAL
EOSINOPHIL # BLD: 0.24 K/UL (ref 0–0.4)
EOSINOPHIL NFR BLD: 2 % (ref 0–7)
ERYTHROCYTE [DISTWIDTH] IN BLOOD BY AUTOMATED COUNT: 17.4 % (ref 11.5–14.5)
FLUAV RNA SPEC QL NAA+PROBE: NOT DETECTED
FLUBV RNA SPEC QL NAA+PROBE: NOT DETECTED
GLOBULIN SER CALC-MCNC: 4.6 G/DL (ref 2–4)
GLUCOSE SERPL-MCNC: 111 MG/DL (ref 65–100)
HCT VFR BLD AUTO: 42.3 % (ref 35–47)
HGB BLD-MCNC: 13.6 G/DL (ref 11.5–16)
IMM GRANULOCYTES # BLD AUTO: 0 K/UL
IMM GRANULOCYTES NFR BLD AUTO: 0 %
LYMPHOCYTES # BLD: 7.13 K/UL (ref 0.8–3.5)
LYMPHOCYTES NFR BLD: 60 % (ref 12–49)
MCH RBC QN AUTO: 28.2 PG (ref 26–34)
MCHC RBC AUTO-ENTMCNC: 32.2 G/DL (ref 30–36.5)
MCV RBC AUTO: 87.8 FL (ref 80–99)
MONOCYTES # BLD: 1.55 K/UL (ref 0–1)
MONOCYTES NFR BLD: 13 % (ref 5–13)
NEUTS BAND NFR BLD MANUAL: 3 % (ref 0–6)
NEUTS SEG # BLD: 2.86 K/UL (ref 1.8–8)
NEUTS SEG NFR BLD: 21 % (ref 32–75)
NRBC # BLD: 0 K/UL (ref 0–0.01)
NRBC BLD-RTO: 0 PER 100 WBC
NT PRO BNP: 23 PG/ML
PLATELET # BLD AUTO: 218 K/UL (ref 150–400)
PMV BLD AUTO: 10 FL (ref 8.9–12.9)
POTASSIUM SERPL-SCNC: 3.3 MMOL/L (ref 3.5–5.1)
PROT SERPL-MCNC: 8 G/DL (ref 6.4–8.2)
RBC # BLD AUTO: 4.82 M/UL (ref 3.8–5.2)
RBC MORPH BLD: ABNORMAL
SARS-COV-2 RNA RESP QL NAA+PROBE: NOT DETECTED
SODIUM SERPL-SCNC: 134 MMOL/L (ref 136–145)
SOURCE: NORMAL
TROPONIN I SERPL HS-MCNC: <4 NG/L (ref 0–51)
WBC # BLD AUTO: 11.9 K/UL (ref 3.6–11)
WBC MORPH BLD: ABNORMAL

## 2025-03-26 PROCEDURE — 6360000004 HC RX CONTRAST MEDICATION: Performed by: STUDENT IN AN ORGANIZED HEALTH CARE EDUCATION/TRAINING PROGRAM

## 2025-03-26 PROCEDURE — 84484 ASSAY OF TROPONIN QUANT: CPT

## 2025-03-26 PROCEDURE — 6370000000 HC RX 637 (ALT 250 FOR IP)

## 2025-03-26 PROCEDURE — 96361 HYDRATE IV INFUSION ADD-ON: CPT

## 2025-03-26 PROCEDURE — 87636 SARSCOV2 & INF A&B AMP PRB: CPT

## 2025-03-26 PROCEDURE — 6360000002 HC RX W HCPCS

## 2025-03-26 PROCEDURE — 85025 COMPLETE CBC W/AUTO DIFF WBC: CPT

## 2025-03-26 PROCEDURE — 71046 X-RAY EXAM CHEST 2 VIEWS: CPT

## 2025-03-26 PROCEDURE — 99285 EMERGENCY DEPT VISIT HI MDM: CPT

## 2025-03-26 PROCEDURE — 93005 ELECTROCARDIOGRAM TRACING: CPT | Performed by: EMERGENCY MEDICINE

## 2025-03-26 PROCEDURE — 83880 ASSAY OF NATRIURETIC PEPTIDE: CPT

## 2025-03-26 PROCEDURE — 85379 FIBRIN DEGRADATION QUANT: CPT

## 2025-03-26 PROCEDURE — 96374 THER/PROPH/DIAG INJ IV PUSH: CPT

## 2025-03-26 PROCEDURE — 71275 CT ANGIOGRAPHY CHEST: CPT

## 2025-03-26 PROCEDURE — 36415 COLL VENOUS BLD VENIPUNCTURE: CPT

## 2025-03-26 PROCEDURE — 2580000003 HC RX 258

## 2025-03-26 PROCEDURE — 80053 COMPREHEN METABOLIC PANEL: CPT

## 2025-03-26 RX ORDER — 0.9 % SODIUM CHLORIDE 0.9 %
1000 INTRAVENOUS SOLUTION INTRAVENOUS ONCE
Status: COMPLETED | OUTPATIENT
Start: 2025-03-26 | End: 2025-03-26

## 2025-03-26 RX ORDER — IOPAMIDOL 755 MG/ML
100 INJECTION, SOLUTION INTRAVASCULAR
Status: COMPLETED | OUTPATIENT
Start: 2025-03-26 | End: 2025-03-26

## 2025-03-26 RX ORDER — POTASSIUM CHLORIDE 750 MG/1
40 TABLET, EXTENDED RELEASE ORAL ONCE
Status: COMPLETED | OUTPATIENT
Start: 2025-03-26 | End: 2025-03-26

## 2025-03-26 RX ORDER — ONDANSETRON 2 MG/ML
4 INJECTION INTRAMUSCULAR; INTRAVENOUS
Status: COMPLETED | OUTPATIENT
Start: 2025-03-26 | End: 2025-03-26

## 2025-03-26 RX ORDER — ONDANSETRON 4 MG/1
4 TABLET, ORALLY DISINTEGRATING ORAL 3 TIMES DAILY PRN
Qty: 21 TABLET | Refills: 0 | Status: SHIPPED | OUTPATIENT
Start: 2025-03-26

## 2025-03-26 RX ADMIN — IOPAMIDOL 60 ML: 755 INJECTION, SOLUTION INTRAVENOUS at 17:30

## 2025-03-26 RX ADMIN — POTASSIUM CHLORIDE 40 MEQ: 750 TABLET, FILM COATED, EXTENDED RELEASE ORAL at 15:54

## 2025-03-26 RX ADMIN — SODIUM CHLORIDE 1000 ML: 0.9 INJECTION, SOLUTION INTRAVENOUS at 17:04

## 2025-03-26 RX ADMIN — ONDANSETRON 4 MG: 2 INJECTION, SOLUTION INTRAMUSCULAR; INTRAVENOUS at 15:51

## 2025-03-26 ASSESSMENT — PAIN DESCRIPTION - LOCATION: LOCATION: CHEST

## 2025-03-26 ASSESSMENT — PAIN SCALES - GENERAL: PAINLEVEL_OUTOF10: 4

## 2025-03-26 ASSESSMENT — LIFESTYLE VARIABLES
HOW OFTEN DO YOU HAVE A DRINK CONTAINING ALCOHOL: NEVER
HOW MANY STANDARD DRINKS CONTAINING ALCOHOL DO YOU HAVE ON A TYPICAL DAY: PATIENT DOES NOT DRINK

## 2025-03-26 NOTE — ED PROVIDER NOTES
[facility] EMERGENCY DEPARTMENT  EMERGENCY DEPARTMENT ENCOUNTER      Date: 3/26/2025  Patient Name: Rakel Coreas  MRN: [id number]  Birthdate 1965  Date of evaluation: 3/26/2025  Provider: JOSE DE JESUS Rose - CLYDE   Note Started: 2:12 PM EDT 3/26/25    CHIEF COMPLAINT     Chief Complaint   Patient presents with    Fatigue       HISTORY OF PRESENT ILLNESS  (Onset, Location, Duration, Character, Alleviating/Aggravating, Radiation, Timing, Severity)   Note limiting factors.   History Provided By: Patient     HPI: Rakel Coreas is a 60 y.o. female with a history of antiphospholipid, CVA, hypertension, hypothyroidism, lupus, MS, nephrolithiasis, Raynaud's, PFO, cholecystectomy tubal ligation, presents with nausea, vomiting, fatigue.  States ongoing over the last 3 months.  Her sister  in January and her son-in-law in February.  States she has also had 2 different rounds of influenza.  Endorses intermittent fevers.  States she had bowel prep for a colonoscopy 2 weeks ago in which she started to feel worse.  States she has had a history of feeling bad when she has been hypokalemic.  She denies chest pain, difficulty breathing, edema, abdominal pain, diarrhea, urinary symptoms, abnormal vaginal bleeding.  No recent antibiotics, surgeries, travel.      Nursing Notes and triage vitals were reviewed.  PCP: Blanco West MD      PAST MEDICAL HISTORY   Past Medical History:  Past Medical History:   Diagnosis Date    Anti-phospholipid syndrome     DDD (degenerative disc disease) age 18    H/O: stroke     History of multiple strokes, PFO-had surgery, star flex in heart to seal hole.    Hypertension     Hypothyroidism     takes Synthoid    Lupus (HCC)     MS (multiple sclerosis) (HCC) 2005     neurologist-Dr. Russell Esquivel, fatigue, no focal deficits    Nausea & vomiting     Nephrolithiasis     Raynaud disease     Dr Rubinstein ( [facility])     Stroke (HCC)     last stroke was in

## 2025-03-26 NOTE — ED TRIAGE NOTES
Pt to ED for being \"sick\" since January. Reports diarrhea and vomiting.     Pt had bowel prep for colonoscopy and states feels that she has been dehydrated since and that her potassium might be low. Hx hypokalemia.    Reports 'pulling sensation on heart'.    Pupils equal, round and reactive to light, Extra-ocular movement intact, eyes are clear b/l

## 2025-03-26 NOTE — ED NOTES
Verbal shift change report given to Devin RN (oncoming nurse) by Livia RN (offgoing nurse). Report included the following information Nurse Handoff Report, ED Encounter Summary, ED SBAR, Adult Overview, MAR, and Recent Results.

## 2025-03-27 LAB — PATH REV BLD -IMP: NORMAL

## 2025-03-28 LAB
EKG ATRIAL RATE: 88 BPM
EKG DIAGNOSIS: NORMAL
EKG P AXIS: 75 DEGREES
EKG P-R INTERVAL: 136 MS
EKG Q-T INTERVAL: 364 MS
EKG QRS DURATION: 90 MS
EKG QTC CALCULATION (BAZETT): 440 MS
EKG R AXIS: 55 DEGREES
EKG T AXIS: -27 DEGREES
EKG VENTRICULAR RATE: 88 BPM

## 2025-03-28 PROCEDURE — 93010 ELECTROCARDIOGRAM REPORT: CPT | Performed by: INTERNAL MEDICINE

## (undated) DEVICE — BAG BELONG PT PERS CLEAR HANDL

## (undated) DEVICE — CATH IV AUTOGRD BC BLU 22GA 25 -- INSYTE

## (undated) DEVICE — PREP SKN CHLRAPRP SNGL 1.75ML --

## (undated) DEVICE — BW-412T DISP COMBO CLEANING BRUSH: Brand: SINGLE USE COMBINATION CLEANING BRUSH

## (undated) DEVICE — SOLIDIFIER FLUID 3000 CC ABSORB

## (undated) DEVICE — CANN NASAL O2 CAPNOGRAPHY AD -- FILTERLINE

## (undated) DEVICE — KENDALL RADIOLUCENT FOAM MONITORING ELECTRODE -RECTANGULAR SHAPE: Brand: KENDALL

## (undated) DEVICE — Device

## (undated) DEVICE — TRNQT TEXT 1X18IN BLU LF DISP -- CONVERT TO ITEM 362165

## (undated) DEVICE — SYR 3ML LL TIP 1/10ML GRAD --

## (undated) DEVICE — ENDO CARRY-ON PROCEDURE KIT INCLUDES ENZYMATIC SPONGE, GAUZE, BIOHAZARD LABEL, TRAY, LUBRICANT, DIRTY SCOPE LABEL, WATER LABEL, TRAY, DRAWSTRING PAD, AND DEFENDO 4-PIECE KIT.: Brand: ENDO CARRY-ON PROCEDURE KIT

## (undated) DEVICE — 1200 GUARD II KIT W/5MM TUBE W/O VAC TUBE: Brand: GUARDIAN

## (undated) DEVICE — DEVON™ KNEE AND BODY STRAP 60" X 3" (1.5 M X 7.6 CM): Brand: DEVON

## (undated) DEVICE — TUBING HYDR IRR --

## (undated) DEVICE — MEDI-VAC NON-CONDUCTIVE SUCTION TUBING: Brand: CARDINAL HEALTH

## (undated) DEVICE — RETRIEVAL BALLOON CATHETER: Brand: EXTRACTOR™ PRO RX

## (undated) DEVICE — Device: Brand: SINGLE USE SOFT BRUSH

## (undated) DEVICE — SYR 5ML 1/5 GRAD LL NSAF LF --

## (undated) DEVICE — NDL FLTR TIP 5 MIC 18GX1.5IN --

## (undated) DEVICE — DEVICE LCK BILI RAP EXCHG OLPS --

## (undated) DEVICE — SPHINCTEROTOME: Brand: DREAMTOME™ RX 44

## (undated) DEVICE — SET ADMIN 16ML TBNG L100IN 2 Y INJ SITE IV PIGGY BK DISP

## (undated) DEVICE — (D)CUP DENT 7.5OZ PLAS DSTY -- DISC BY MFR USE ITEM 341725

## (undated) DEVICE — Z DISCONTINUED NO SUB IDED SET EXTN W/ 4 W STPCOCK M SPIN LOK 36IN

## (undated) DEVICE — BITEBLOCK ENDOSCP 60FR MAXI WHT POLYETH STURDY W/ VELC WVN

## (undated) DEVICE — INFLATION DEVICE: Brand: ENCORE 26

## (undated) DEVICE — FORCEPS BX L240CM JAW DIA2.8MM L CAP W/ NDL MIC MESH TOOTH

## (undated) DEVICE — NDL PRT INJ NSAF BLNT 18GX1.5 --

## (undated) DEVICE — SYR LR LCK 1ML GRAD NSAF 30ML --

## (undated) DEVICE — KIT COLON W/ 1.1OZ LUB AND 2 END

## (undated) DEVICE — WIREGUIDED CYTOLOGY BRUSH: Brand: RX CYTOLOGY BRUSH

## (undated) DEVICE — SNARE ENDOSCP M L240CM W27MM SHTH DIA2.4MM CHN 2.8MM HEX

## (undated) DEVICE — BALLOON DILATATION CATHETER: Brand: HURRICANE™ RX